# Patient Record
Sex: FEMALE | Race: WHITE | NOT HISPANIC OR LATINO | Employment: FULL TIME
[De-identification: names, ages, dates, MRNs, and addresses within clinical notes are randomized per-mention and may not be internally consistent; named-entity substitution may affect disease eponyms.]

---

## 2017-06-24 ENCOUNTER — HEALTH MAINTENANCE LETTER (OUTPATIENT)
Age: 62
End: 2017-06-24

## 2017-08-02 ENCOUNTER — OFFICE VISIT (OUTPATIENT)
Dept: UROLOGY | Facility: CLINIC | Age: 62
End: 2017-08-02
Payer: COMMERCIAL

## 2017-08-02 DIAGNOSIS — N39.0 RECURRENT UTI (URINARY TRACT INFECTION): Primary | ICD-10-CM

## 2017-08-02 LAB
ALBUMIN UR-MCNC: NEGATIVE MG/DL
APPEARANCE UR: CLEAR
BILIRUB UR QL STRIP: NEGATIVE
COLOR UR AUTO: YELLOW
GLUCOSE UR STRIP-MCNC: NEGATIVE MG/DL
HGB UR QL STRIP: ABNORMAL
KETONES UR STRIP-MCNC: NEGATIVE MG/DL
LEUKOCYTE ESTERASE UR QL STRIP: NEGATIVE
NITRATE UR QL: NEGATIVE
PH UR STRIP: 6 PH (ref 5–7)
SP GR UR STRIP: <=1.005 (ref 1–1.03)
URN SPEC COLLECT METH UR: ABNORMAL
UROBILINOGEN UR STRIP-ACNC: 0.2 EU/DL (ref 0.2–1)

## 2017-08-02 PROCEDURE — 81003 URINALYSIS AUTO W/O SCOPE: CPT | Performed by: UROLOGY

## 2017-08-02 PROCEDURE — 99203 OFFICE O/P NEW LOW 30 MIN: CPT | Performed by: UROLOGY

## 2017-08-02 NOTE — LETTER
8/2/2017       RE: Mari Turner  [place] [address]     Dear Colleague,    Thank you for referring your patient, Mari Turner, to the [facility]OLOG[facility] [place] at [facility]. Please see a copy of my visit note below.    History: It is a pleasure to see this very pleasant 62-year-old lady in initial consultation today.  She has a recent history of urinary tract infection which is been effectively treated but she has some mild persistent dysuria which seems to be gradually improving but is concerning.  She has not observed gross hematuria.  Urinalysis today shows only a trace of blood in the urine with no other remarkable features.  Her general health is otherwise satisfactory.  She has only had very rare urinary tract infections in the past.  She has never in the past observed gross hematuria, is not troubled by incontinence and is not troubled by significant frequency of micturition or nocturia  There are no other major medical issues at this time    Past Medical History:   Diagnosis Date     Mumps        No past surgical history on file.    No family history on file.    Social History     Social History     Marital status:      Spouse name: N/A     Number of children: N/A     Years of education: N/A     Occupational History     Not on file.     Social History Main Topics     Smoking status: Never Smoker     Smokeless tobacco: Never Used     Alcohol use Not on file     Drug use: Not on file     Sexual activity: Not on file     Other Topics Concern     Not on file     Social History Narrative     No narrative on file       No current outpatient prescriptions on file.     Review Of Systems:  Skin: negative  Eyes: negative  Ears/Nose/Throat: negative  Respiratory: No shortness of breath, dyspnea on exertion, cough, or hemoptysis  Cardiovascular: negative  Gastrointestinal: negative  Genitourinary: negative  Musculoskeletal:  negative  Neurologic: negative  Psychiatric: negative  Hematologic/Lymphatic/Immunologic: negative  Endocrine: negative    Exam:  There were no vitals taken for this visit.    General Impression: Very pleasant lady in no acute distress, well-oriented in time place and person    Mental status normal    HEENT: There is no evidence of jaundice and mucous membranes are normal    Skin: Skin is otherwise normal to examination    Lymph Nodes: Not examined    Respiratory System: The respiratory cycle is normal    Cardiovascular: Not examined    Abdominal: Not examined    Extremities: There is no significant peripheral edema    Back and Flank: There is no flank tenderness    Genital: Not examined    Rectal: Not examined    Neurologic:  There are no focal abnormal clinical neurological signs in the central, or peripheral nervous systems    Impression: This very pleasant 62-year-old lady has had a recent urinary tract infection which is being completely treated but as some persistent dysuria.  She also has a history of a bicuspid aortic valve.  However on careful discussion it transpires that she is drinking a lot of caffeinated beverages, most notably caffeinated sodas.  She is not drinking cranberry juice.  I discussed the situation with her in detail.  I have advised her that the most likely cause of her problems with dysuria are related to drinking caffeinated beverages and that she should stop doing this right away to see if it benefits of symptoms as I expect that it well.  I do not think we need to consider other treatment at this time.  However I have asked her to let me know should these symptoms not resolve by 4 weeks from now and if there is ongoing problems with the symptoms we will need to consider cystoscopy.  She had had a CT scan a year ago of the abdomen and pelvis and this did show a cystic lesion in the left kidney which had the characteristics of a calyceal diverticulum although there were no stones  "reported within the diverticulum.  She has not any associated flank pain and it is my opinion that this calyceal diverticulum has no relationship to her current urinary tract infection.  Usually these can be left alone if small and causing no symptoms, or causing stone formation.  I'll be very happy to see her again should this not resolve.  I have advised her that I should see her in the future if she observed any of the following  1.  Gross hematuria  2.  Worsening urinary symptoms  3 recurrent urinary tract infections    As stated earlier if this occurs she will require cystoscopy    I discussed the entire situation with the patient in detail.  I answered all her questions    Plan: I will see her on a p.r.n. basis.  If symptoms do not resolve she will require cystoscopy.  I advised to call us should this situation occur    Time: 30 minutes.  Greater than 50% spent in discussion and consultation    \"This dictation was performed with voice recognition software and may contain errors,  omissions and inadvertent word substitution.\"  Sincerely,    Parviz Mijares MD      "

## 2017-08-02 NOTE — MR AVS SNAPSHOT
"              After Visit Summary   2017    Mari Turner    MRN: [id number]           Patient Information     Date Of Birth          1955        Visit Information        Provider Department      2017 3:30 PM Parviz Mijares MD [facility] [place]        Today's Diagnoses     Recurrent UTI (urinary tract infection)    -  1       Follow-ups after your visit        Follow-up notes from your care team     Return if symptoms worsen or fail to improve.      Who to contact     If you have questions or need follow up information about today's clinic visit or your schedule please contact [facility] [place] directly at [phone].  Normal or non-critical lab and imaging results will be communicated to you by [employer], letter or phone within 4 business days after the clinic has received the results. If you do not hear from us within 7 days, please contact the clinic through [employer] or phone. If you have a critical or abnormal lab result, we will notify you by phone as soon as possible.  Submit refill requests through [employer] or call your pharmacy and they will forward the refill request to us. Please allow 3 business days for your refill to be completed.          Additional Information About Your Visit        MyChart Information     [employer] lets you send messages to your doctor, view your test results, renew your prescriptions, schedule appointments and more. To sign up, go to [email or web address] . Click on \"Log in\" on the left side of the screen, which will take you to the Welcome page. Then click on \"Sign up Now\" on the right side of the page.     You will be asked to enter the access code listed below, as well as some personal information. Please follow the directions to create your username and password.     Your access code is: [email or web address]  Expires: 10/31/2017  5:18 PM     Your access code will  in 90 days. If you need help " or a new code, please call your [facility] or [phone].        Care EveryWhere ID     This is your Care EveryWhere ID. This could be used by other organizations to access your [place] medical records  [id number]         Blood Pressure from Last 3 Encounters:   No data found for BP    Weight from Last 3 Encounters:   No data found for Wt              We Performed the Following     UA without Microscopic        Primary Care Provider Office Phone # Fax #    Radha Montoya [phone]       [facility] [address] [address] [address]        Equal Access to Services     [facility]: Hadii aad ku hadasho Soomaali, waaxda luqadaha, qaybta kaalmada adeteresayada, mae pedroza adeteresa santos . So [facility] [phone].    ATENCIÓN: Si habla español, tiene a woodward disposición servicios gratuitos de asistencia lingüística. [facility] [phone].    We comply with applicable federal civil rights laws and [place] laws. We do not discriminate on the basis of race, color, national origin, age, disability sex, sexual orientation or gender identity.            Thank you!     Thank you for choosing [facility] [place]  for your care. Our goal is always to provide you with excellent care. Hearing back from our patients is one way we can continue to improve our services. Please take a few minutes to complete the written survey that you may receive in the mail after your visit with us. Thank you!             Your Updated Medication List - Protect others around you: Learn how to safely use, store and throw away your medicines at www.disposemymeds.org.      Notice  As of 8/2/2017  5:18 PM    You have not been prescribed any medications.

## 2017-08-02 NOTE — PROGRESS NOTES
History: It is a pleasure to see this very pleasant 62-year-old lady in initial consultation today.  She has a recent history of urinary tract infection which is been effectively treated but she has some mild persistent dysuria which seems to be gradually improving but is concerning.  She has not observed gross hematuria.  Urinalysis today shows only a trace of blood in the urine with no other remarkable features.  Her general health is otherwise satisfactory.  She has only had very rare urinary tract infections in the past.  She has never in the past observed gross hematuria, is not troubled by incontinence and is not troubled by significant frequency of micturition or nocturia  There are no other major medical issues at this time    Past Medical History:   Diagnosis Date     Mumps        No past surgical history on file.    No family history on file.    Social History     Social History     Marital status:      Spouse name: N/A     Number of children: N/A     Years of education: N/A     Occupational History     Not on file.     Social History Main Topics     Smoking status: Never Smoker     Smokeless tobacco: Never Used     Alcohol use Not on file     Drug use: Not on file     Sexual activity: Not on file     Other Topics Concern     Not on file     Social History Narrative     No narrative on file       No current outpatient prescriptions on file.       Review Of Systems:  Skin: negative  Eyes: negative  Ears/Nose/Throat: negative  Respiratory: No shortness of breath, dyspnea on exertion, cough, or hemoptysis  Cardiovascular: negative  Gastrointestinal: negative  Genitourinary: negative  Musculoskeletal: negative  Neurologic: negative  Psychiatric: negative  Hematologic/Lymphatic/Immunologic: negative  Endocrine: negative    Exam:  There were no vitals taken for this visit.    General Impression: Very pleasant lady in no acute distress, well-oriented in time place and person    Mental status  normal    HEENT: There is no evidence of jaundice and mucous membranes are normal    Skin: Skin is otherwise normal to examination    Lymph Nodes: Not examined    Respiratory System: The respiratory cycle is normal    Cardiovascular: Not examined    Abdominal: Not examined    Extremities: There is no significant peripheral edema    Back and Flank: There is no flank tenderness    Genital: Not examined    Rectal: Not examined    Neurologic:  There are no focal abnormal clinical neurological signs in the central, or peripheral nervous systems    Impression: This very pleasant 62-year-old lady has had a recent urinary tract infection which is being completely treated but as some persistent dysuria.  She also has a history of a bicuspid aortic valve.  However on careful discussion it transpires that she is drinking a lot of caffeinated beverages, most notably caffeinated sodas.  She is not drinking cranberry juice.  I discussed the situation with her in detail.  I have advised her that the most likely cause of her problems with dysuria are related to drinking caffeinated beverages and that she should stop doing this right away to see if it benefits of symptoms as I expect that it well.  I do not think we need to consider other treatment at this time.  However I have asked her to let me know should these symptoms not resolve by 4 weeks from now and if there is ongoing problems with the symptoms we will need to consider cystoscopy.  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  She has not any associated flank pain and it is my opinion that this calyceal diverticulum has no relationship to her current urinary tract infection.  Usually these can be left alone if small and causing no symptoms, or causing stone formation.  I'll be very happy  "to see her again should this not resolve.  I have advised her that I should see her in the future if she observed any of the following  1.  Gross hematuria  2.  Worsening urinary symptoms  3 recurrent urinary tract infections    As stated earlier if this occurs she will require cystoscopy    I discussed the entire situation with the patient in detail.  I answered all her questions    Plan: I will see her on a p.r.n. basis.  If symptoms do not resolve she will require cystoscopy.  I advised to call us should this situation occur    Time: 30 minutes.  Greater than 50% spent in discussion and consultation    \"This dictation was performed with voice recognition software and may contain errors,  omissions and inadvertent word substitution.\"    "

## 2017-08-02 NOTE — NURSING NOTE
pvr =84ml  Pt states she is uncomfortable when bladder is full.  Dysuria, and sometimes freq.   Pt  Up once and awhile to void during the nt.  I talked with pt about bladder irritants.  HLA Espinoza, CMA

## 2017-11-14 ENCOUNTER — OFFICE VISIT (OUTPATIENT)
Dept: UROLOGY | Facility: CLINIC | Age: 62
End: 2017-11-14
Payer: COMMERCIAL

## 2017-11-14 DIAGNOSIS — Z87.898 HISTORY OF DYSURIA: Primary | ICD-10-CM

## 2017-11-14 DIAGNOSIS — Z79.2 PROPHYLACTIC ANTIBIOTIC: Primary | ICD-10-CM

## 2017-11-14 DIAGNOSIS — Z87.898 HISTORY OF DYSURIA: ICD-10-CM

## 2017-11-14 LAB
ALBUMIN UR-MCNC: NEGATIVE MG/DL
APPEARANCE UR: CLEAR
BILIRUB UR QL STRIP: NEGATIVE
COLOR UR AUTO: YELLOW
GLUCOSE UR STRIP-MCNC: NEGATIVE MG/DL
HGB UR QL STRIP: ABNORMAL
KETONES UR STRIP-MCNC: NEGATIVE MG/DL
LEUKOCYTE ESTERASE UR QL STRIP: NEGATIVE
NITRATE UR QL: NEGATIVE
PH UR STRIP: 5.5 PH (ref 5–7)
SOURCE: ABNORMAL
SP GR UR STRIP: 1.01 (ref 1–1.03)
UROBILINOGEN UR STRIP-ACNC: 0.2 EU/DL (ref 0.2–1)

## 2017-11-14 PROCEDURE — 99213 OFFICE O/P EST LOW 20 MIN: CPT | Mod: 25 | Performed by: UROLOGY

## 2017-11-14 PROCEDURE — 52000 CYSTOURETHROSCOPY: CPT | Performed by: UROLOGY

## 2017-11-14 PROCEDURE — 81003 URINALYSIS AUTO W/O SCOPE: CPT | Performed by: UROLOGY

## 2017-11-14 RX ORDER — CIPROFLOXACIN 500 MG/1
500 TABLET, FILM COATED ORAL ONCE
Qty: 1 TABLET | Refills: 0 | Status: SHIPPED | OUTPATIENT
Start: 2017-11-14 | End: 2017-11-14

## 2017-11-14 RX ORDER — DIPHENOXYLATE HCL/ATROPINE 2.5-.025MG
1 TABLET ORAL 4 TIMES DAILY PRN
COMMUNITY
End: 2019-06-24

## 2017-11-14 NOTE — LETTER
11/14/2017       RE: Mari Turner  [place] [address]     Dear Colleague,    Thank you for referring your patient, Mari Turner, to the [facility] [place] at [facility]. Please see a copy of my visit note below.    This very pleasant 62-year-old lady returns today for cystoscopy.  She has a previous history of recurrent infections although there is no sign of that now that she is getting persistent dysuria with daytime frequency but without evidence of incontinence.  She is also noticing what is likely significant pelvic pain.  We had recommended a regime of elimination of caffeine and crying reduce and taking a regular warm baths but this has incompletely resolve the situation.  The CT scan showed no remarkable features except for a calyceal diverticulum in the left kidney without stones.  There is no evidence of flank pain and this calyceal diverticulum is unlikely to have any relationship to her pelvic symptoms.    .  Procedure.  Cystoscopy with dilatation of urethra.  Surgeon.   Dyllan.  Anesthesia.  Local anesthesia.  Description.  With the patient in the dorsal lithotomy position, with the genital area prepped and draped in the gastric fashion.  I examined the area there is a very small urethral caruncle but of little clinical significance.  I could not insert the cystoscope due to stenosis of the urethra.  I then dilated the urethra from 16 up to 20-Mongolian with the straight urethral dilators with some discomfort to the patient, but I could then insert the flexible cystoscope without difficulty.  There was no evidence of neoplasm, calculus or inflammation in the bladder.  It were no other remarkable features.    Impression.  It is possible that much of her symptoms may be related to urethral stenosis.  It is possible that the dilatation may relieve some of those symptoms.  However if there are continuing symptoms  "aren't better recommend a course of urethral suppositories to see if we can help alleviate the situation.  I have once again however reemphasized the importance of caffeine avoidance etc. as before.  I will see her again if are not making progress and that is the case we may need to last the opinion of Dr. Concepcion Berry.  I discussed the entire situation carefully with the patient in detail today.  I answered all the questions.    Plan.  Course of urethral suppositories and I will then see her on a p.r.n. basis.  If needed, we may refer her to Dr. Berry..    Time.  15 minutes was spent in addition to the procedure to carefully review of the previous records to talk about the findings to discuss potential alternative treatment options and possibly even the need to further referral    \"This dictation was performed with voice recognition software and may contain errors,  omissions and inadvertent word substitution.\"      Again, thank you for allowing me to participate in the care of your patient.      Sincerely,    Parviz Mijares MD      "

## 2017-11-14 NOTE — PATIENT INSTRUCTIONS

## 2017-11-14 NOTE — PROGRESS NOTES
This very pleasant 62-year-old lady returns today for cystoscopy.  She has a previous history of recurrent infections although there is no sign of that now that she is getting persistent dysuria with daytime frequency but without evidence of incontinence.  She is also noticing what is likely significant pelvic pain.  We had recommended a regime of elimination of caffeine and crying reduce and taking a regular warm baths but this has incompletely resolve the situation.  The CT scan showed no remarkable features except for a calyceal diverticulum in the left kidney without stones.  There is no evidence of flank pain and this calyceal diverticulum is unlikely to have any relationship to her pelvic symptoms.    .  Procedure.  Cystoscopy with dilatation of urethra.  Surgeon.   Dyllan.  Anesthesia.  Local anesthesia.  Description.  With the patient in the dorsal lithotomy position, with the genital area prepped and draped in the gastric fashion.  I examined the area there is a very small urethral caruncle but of little clinical significance.  I could not insert the cystoscope due to stenosis of the urethra.  I then dilated the urethra from 16 up to 20-Bangladeshi with the straight urethral dilators with some discomfort to the patient, but I could then insert the flexible cystoscope without difficulty.  There was no evidence of neoplasm, calculus or inflammation in the bladder.  It were no other remarkable features.    Impression.  It is possible that much of her symptoms may be related to urethral stenosis.  It is possible that the dilatation may relieve some of those symptoms.  However if there are continuing symptoms aren't better recommend a course of urethral suppositories to see if we can help alleviate the situation.  I have once again however reemphasized the importance of caffeine avoidance etc. as before.  I will see her again if are not making progress and that is the case we may need to last the opinion of   "Concepcion Berry.  I discussed the entire situation carefully with the patient in detail today.  I answered all the questions.    Plan.  Course of urethral suppositories and I will then see her on a p.r.n. basis.  If needed, we may refer her to Dr. Berry..    Time.  15 minutes was spent in addition to the procedure to carefully review of the previous records to talk about the findings to discuss potential alternative treatment options and possibly even the need to further referral    \"This dictation was performed with voice recognition software and may contain errors,  omissions and inadvertent word substitution.\"    "

## 2017-11-14 NOTE — NURSING NOTE
Chief Complaint   Patient presents with     Cystoscopy     History of Recurret Uti's      Prior to the start of the procedure and with procedural staff participation, I verbally confirmed the patient s identity using two indicators, relevant allergies, that the procedure was appropriate and matched the consent or emergent situation, and that the correct equipment/implants were available. Immediately prior to starting the procedure I conducted the Time Out with the procedural staff and re-confirmed the patient s name, procedure, and site/side. (The Joint Commission universal protocol was followed.)  Yes    Sedation (Moderate or Deep): None    Tram Feliz LPN

## 2017-11-14 NOTE — MR AVS SNAPSHOT
"              After Visit Summary   11/14/2017    Mari Turner    MRN: [id number]           Patient Information     Date Of Birth          1955        Visit Information        Provider Department      11/14/2017 3:40 PM Parviz Mijares MD; [facility] [place]        Today's Diagnoses     Prophylactic antibiotic    -  1    History of dysuria          Care Instructions         AFTER YOUR CYSTOSCOPY         You have just completed a cystoscopy, or \"cysto\", which allowed your physician to learn more about your bladder (or to remove a stent placed after surgery). We suggest that you continue to avoid caffeine, fruit juice, and alcohol for the next 24 hours, however, you are encouraged to return to your normal activities.       A few things that are considered normal after your cystoscopy:    * small amount of bleeding (or spotting) that clears within the next 24 hours    * slight burning sensation with urination    * sensation to of needing to avoid more frequently    * the feeling of \"air\" in your urine    * mild discomfort that is relieved with Tylonol        Please contact our office promptly if you:    * develop a fever above 101 degrees    * are unable to urinate    * develop bright red blood that does not stop    * severe pain or swelling        And of course, please contact our office with any concerns or questions [phone]              Follow-ups after your visit        Who to contact     If you have questions or need follow up information about today's clinic visit or your schedule please contact [facility] [place] directly at [phone].  Normal or non-critical lab and imaging results will be communicated to you by MyChart, letter or phone within 4 business days after the clinic has received the results. If you do not hear from us within 7 days, please contact the clinic through MyChart or phone. If you have a critical " "or abnormal lab result, we will notify you by phone as soon as possible.  Submit refill requests through Everwise or call your pharmacy and they will forward the refill request to us. Please allow 3 business days for your refill to be completed.          Additional Information About Your Visit        OROShart Information     Everwise lets you send messages to your doctor, view your test results, renew your prescriptions, schedule appointments and more. To sign up, go to [email or web address] . Click on \"Log in\" on the left side of the screen, which will take you to the Welcome page. Then click on \"Sign up Now\" on the right side of the page.     You will be asked to enter the access code listed below, as well as some personal information. Please follow the directions to create your username and password.     Your access code is: [email or web address]  Expires: 2018  4:22 PM     Your access code will  in 90 days. If you need help or a new code, please call your [place] clinic or [phone].        Care EveryWhere ID     This is your Care EveryWhere ID. This could be used by other organizations to access your [place] medical records  [id number]         Blood Pressure from Last 3 Encounters:   No data found for BP    Weight from Last 3 Encounters:   No data found for Wt              We Performed the Following     UA without Microscopic          Today's Medication Changes          These changes are accurate as of: 17  4:22 PM.  If you have any questions, ask your nurse or doctor.               Start taking these medicines.        Dose/Directions    * ciprofloxacin 500 MG tablet   Commonly known as:  CIPRO   Used for:  Prophylactic antibiotic   Started by:  Parviz Mijares MD        Dose:  500 mg   Take 1 tablet (500 mg) by mouth once for 1 dose   Quantity:  1 tablet   Refills:  0       * ciprofloxacin 500 MG tablet   Commonly known as:  CIPRO   Used for:  History of dysuria, Prophylactic antibiotic "   Started by:  Parviz Mijares MD        Dose:  500 mg   Take 1 tablet (500 mg) by mouth once for 1 dose   Quantity:  1 tablet   Refills:  0       * Notice:  This list has 2 medication(s) that are the same as other medications prescribed for you. Read the directions carefully, and ask your doctor or other care provider to review them with you.         Where to get your medicines      These medications were sent to [facility] [place], [place] - [address]  [address] [facility] [address], [place] [address]     Phone:  [phone]     ciprofloxacin 500 MG tablet         These medications were sent to [facility] [address] [place] [address] [address] AT [place] [address]  [id number] [place] [address]    Hours:  24-hours Phone:  [phone]     ciprofloxacin 500 MG tablet                Primary Care Provider Office Phone # Fax #    Radha Montoya [phone]       [facility] [place] [address] [facility] [address]        Equal Access to Services     [facility]: Hadii aad ku hadasho Soomaali, waaxda luqadaha, qaybta kaalmada adeegyaqing, mae santos . So [facility] [phone].    ATENCIÓN: Si habla español, tiene a woodward disposición servicios gratuitos de asistencia lingüística. [facility] [phone].    We comply with applicable federal civil rights laws and [place] laws. We do not discriminate on the basis of race, color, national origin, age, disability, sex, sexual orientation, or gender identity.            Thank you!     Thank you for choosing [facility] [place]  for your care. Our goal is always to provide you with excellent care. Hearing back from our patients is one way we can continue to improve our services. Please take a few minutes to complete the written survey that you may receive in the mail after your visit with us. Thank you!             Your Updated  Medication List - Protect others around you: Learn how to safely use, store and throw away your medicines at www.disposemymeds.org.          This list is accurate as of: 11/14/17  4:22 PM.  Always use your most recent med list.                   Brand Name Dispense Instructions for use Diagnosis    * ciprofloxacin 500 MG tablet    CIPRO    1 tablet    Take 1 tablet (500 mg) by mouth once for 1 dose    Prophylactic antibiotic       * ciprofloxacin 500 MG tablet    CIPRO    1 tablet    Take 1 tablet (500 mg) by mouth once for 1 dose    History of dysuria, Prophylactic antibiotic       diphenoxylate-atropine 2.5-0.025 MG per tablet    LOMOTIL     Take 1 tablet by mouth 4 times daily as needed for diarrhea        * Notice:  This list has 2 medication(s) that are the same as other medications prescribed for you. Read the directions carefully, and ask your doctor or other care provider to review them with you.

## 2018-05-31 ENCOUNTER — TRANSFERRED RECORDS (OUTPATIENT)
Dept: HEALTH INFORMATION MANAGEMENT | Facility: CLINIC | Age: 63
End: 2018-05-31

## 2018-06-06 DIAGNOSIS — N28.1 ACQUIRED CYST OF KIDNEY: Primary | ICD-10-CM

## 2018-06-07 ENCOUNTER — OFFICE VISIT (OUTPATIENT)
Dept: UROLOGY | Facility: CLINIC | Age: 63
End: 2018-06-07
Payer: COMMERCIAL

## 2018-06-07 VITALS
WEIGHT: 155 LBS | OXYGEN SATURATION: 98 % | BODY MASS INDEX: 26.46 KG/M2 | HEART RATE: 82 BPM | SYSTOLIC BLOOD PRESSURE: 110 MMHG | DIASTOLIC BLOOD PRESSURE: 74 MMHG | HEIGHT: 64 IN

## 2018-06-07 DIAGNOSIS — N28.1 ACQUIRED CYST OF KIDNEY: ICD-10-CM

## 2018-06-07 LAB
ALBUMIN UR-MCNC: NEGATIVE MG/DL
APPEARANCE UR: CLEAR
BILIRUB UR QL STRIP: NEGATIVE
COLOR UR AUTO: YELLOW
GLUCOSE UR STRIP-MCNC: NEGATIVE MG/DL
HGB UR QL STRIP: ABNORMAL
KETONES UR STRIP-MCNC: NEGATIVE MG/DL
LEUKOCYTE ESTERASE UR QL STRIP: NEGATIVE
NITRATE UR QL: NEGATIVE
PH UR STRIP: 5 PH (ref 5–7)
SOURCE: ABNORMAL
SP GR UR STRIP: 1.02 (ref 1–1.03)
UROBILINOGEN UR STRIP-ACNC: 0.2 EU/DL (ref 0.2–1)

## 2018-06-07 PROCEDURE — 81003 URINALYSIS AUTO W/O SCOPE: CPT | Performed by: UROLOGY

## 2018-06-07 PROCEDURE — 99214 OFFICE O/P EST MOD 30 MIN: CPT | Performed by: UROLOGY

## 2018-06-07 RX ORDER — SIMVASTATIN 20 MG
20 TABLET ORAL
COMMUNITY
Start: 2018-05-14

## 2018-06-07 ASSESSMENT — PAIN SCALES - GENERAL: PAINLEVEL: MILD PAIN (3)

## 2018-06-07 NOTE — PROGRESS NOTES
History: There is a great pleasure to see this very pleasant 62-year-old lady in follow-up consultation today  We had seen her in the past because of a history of urinary tract infection.  We did perform cystoscopy at that time, finding some stenosis of the urethra, but no other remarkable features.  She has had occasional episodes of dysuria since then but on each occasion urinalysis is negative.  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  More recently she is to be done of developing pain in the left side which she describes as passing down from the low part of the back on the left side down her buttock and made worse by certain movements.  In addition a recent CT scan without contrast shows calcification wall of the cyst in the left kidney  There is some concern about whether this is anything of significance of whether this needs further investigation or intervention    Past Medical History:   Diagnosis Date     Mumps        Social History     Social History     Marital status:      Spouse name: N/A     Number of children: N/A     Years of education: N/A     Social History Main Topics     Smoking status: Never Smoker     Smokeless tobacco: Never Used     Alcohol use None     Drug use: None     Sexual activity: Not Asked     Other Topics Concern     None     Social History Narrative       History reviewed. No pertinent surgical history.    History reviewed. No pertinent family history.      Current Outpatient Prescriptions:      simvastatin (ZOCOR) 20 MG tablet, Take 20 mg by mouth, Disp: , Rfl:      diphenoxylate-atropine (LOMOTIL) 2.5-0.025 MG per tablet, Take 1 tablet by mouth 4 times daily as needed for diarrhea, Disp: , Rfl:     10 point ROS of systems including Constitutional, Eyes, Respiratory, Cardiovascular, Gastroenterology, Genitourinary, Integumentary, Muscularskeletal,  "Psychiatric were all negative except for pertinent positives noted in my HPI.    Examination:   /74 (BP Location: Left arm, Patient Position: Sitting, Cuff Size: Adult Regular)  Pulse 82  Ht 1.626 m (5' 4\")  Wt 70.3 kg (155 lb)  SpO2 98%  BMI 26.61 kg/m2  General Impression: Very pleasant lady in no acute distress, well oriented in time place and person  Mental Status: Normal.  HEENT there is no clinical evidence of jaundice and the mucous membranes are normal  Skin: The skin is normal to examination  Respiratory System: Respiratory cycle is normal  Lymph Nodes: Not examined  Back/Flank Tenderness: There is no flank tenderness over the kidney, there is however tenderness just above the left buttock on the left side and tenderness over the buttock itself.  I noted that direct flexion and extension of the spine causes no pain but that attempts at rotation of the spine caused some pain in this area.  I could detect no evidence of kyphosis, scoliosis or lordosis  Cardiovascular System: Not examined  Abdominal Examination: Not examined  Extremities: There is no peripheral edema  Genitial: Not examined  Rectal Examination: Not examined  Neurologic System: There are no focal abnormal clinical neurological signs in the central, or peripheral nervous systems    Impression: I extensively reviewed her previous radiologic studies.  A cystic lesion in the left kidney is slowly getting bigger and was 5.5 cm in diameter last year and now was 6.1 cm in diameter.  There is a septation within this cystic lesion and some calcium in the wall of the cyst.  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  Following this discussion however I would like to continue to monitor this and recommend we repeat the CT scan with and without contrast of the abdomen and " "pelvis, in 1 years time.  In addition I have advised the patient that the symptoms she describes are not necessarily due to an infection when she gets dysuria I we have not demonstrated any evidence of infection on urinalysis on these occasions.  She is drinking a lot of sodas and I am recommending that the caffeine related to this may be a major feature of her symptoms and that she should endeavor to avoid caffeinated beverages.  I did discuss the entire situation with the patient in detail today.  We did have a lengthy discussion including discussion during the consultation with my radiologic colleagues.  I answered many questions.    Plan: 1 year for CT scan abdomen and pelvis with and without contrast and examination    Time: 30 minutes with greater than 50% in discussion and consultation    \"This dictation was performed with voice recognition software and may contain errors,  omissions and inadvertent word substitution.\"      "

## 2018-06-07 NOTE — LETTER
6/7/2018       RE: Mari Turner  [place] [address]     Dear Colleague,    Thank you for referring your patient, Mari Turner, to the [facility] [place] at [facility]. Please see a copy of my visit note below.    History: There is a great pleasure to see this very pleasant 62-year-old lady in follow-up consultation today  We had seen her in the past because of a history of urinary tract infection.  We did perform cystoscopy at that time, finding some stenosis of the urethra, but no other remarkable features.  She has had occasional episodes of dysuria since then but on each occasion urinalysis is negative.  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  More recently she is to be done of developing pain in the left side which she describes as passing down from the low part of the back on the left side down her buttock and made worse by certain movements.  In addition a recent CT scan without contrast shows calcification wall of the cyst in the left kidney  There is some concern about whether this is anything of significance of whether this needs further investigation or intervention    Past Medical History:   Diagnosis Date     Mumps        Social History     Social History     Marital status:      Spouse name: N/A     Number of children: N/A     Years of education: N/A     Social History Main Topics     Smoking status: Never Smoker     Smokeless tobacco: Never Used     Alcohol use None     Drug use: None     Sexual activity: Not Asked     Other Topics Concern     None     Social History Narrative       History reviewed. No pertinent surgical history.    History reviewed. No pertinent family history.      Current Outpatient Prescriptions:      simvastatin (ZOCOR) 20 MG tablet, Take 20 mg by  "mouth, Disp: , Rfl:      diphenoxylate-atropine (LOMOTIL) 2.5-0.025 MG per tablet, Take 1 tablet by mouth 4 times daily as needed for diarrhea, Disp: , Rfl:     10 point ROS of systems including Constitutional, Eyes, Respiratory, Cardiovascular, Gastroenterology, Genitourinary, Integumentary, Muscularskeletal, Psychiatric were all negative except for pertinent positives noted in my HPI.    Examination:   /74 (BP Location: Left arm, Patient Position: Sitting, Cuff Size: Adult Regular)  Pulse 82  Ht 1.626 m (5' 4\")  Wt 70.3 kg (155 lb)  SpO2 98%  BMI 26.61 kg/m2  General Impression: Very pleasant lady in no acute distress, well oriented in time place and person  Mental Status: Normal.  HEENT there is no clinical evidence of jaundice and the mucous membranes are normal  Skin: The skin is normal to examination  Respiratory System: Respiratory cycle is normal  Lymph Nodes: Not examined  Back/Flank Tenderness: There is no flank tenderness over the kidney, there is however tenderness just above the left buttock on the left side and tenderness over the buttock itself.  I noted that direct flexion and extension of the spine causes no pain but that attempts at rotation of the spine caused some pain in this area.  I could detect no evidence of kyphosis, scoliosis or lordosis  Cardiovascular System: Not examined  Abdominal Examination: Not examined  Extremities: There is no peripheral edema  Genitial: Not examined  Rectal Examination: Not examined  Neurologic System: There are no focal abnormal clinical neurological signs in the central, or peripheral nervous systems    Impression: I extensively reviewed her previous radiologic studies.  A cystic lesion in the left kidney is slowly getting bigger and was 5.5 cm in diameter last year and now was 6.1 cm in diameter.  There is a septation within this cystic lesion and some calcium in the wall of the cyst.  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  Following this discussion however I would like to continue to monitor this and recommend we repeat the CT scan with and without contrast of the abdomen and pelvis, in 1 years time.  In addition I have advised the patient that the symptoms she describes are not necessarily due to an infection when she gets dysuria I we have not demonstrated any evidence of infection on urinalysis on these occasions.  She is drinking a lot of sodas and I am recommending that the caffeine related to this may be a major feature of her symptoms and that she should endeavor to avoid caffeinated beverages.  I did discuss the entire situation with the patient in detail today.  We did have a lengthy discussion including discussion during the consultation with my radiologic colleagues.  I answered many questions.    Plan: 1 year for CT scan abdomen and pelvis with and without contrast and examination    Time: 30 minutes with greater than 50% in discussion and consultation    \"This dictation was performed with voice recognition software and may contain errors,  omissions and inadvertent word substitution.\"        Again, thank you for allowing me to participate in the care of your patient.      Sincerely,    Parviz Mijares MD      "

## 2018-06-07 NOTE — MR AVS SNAPSHOT
"              After Visit Summary   6/7/2018    Mari Turner    MRN: [id number]           Patient Information     Date Of Birth          1955        Visit Information        Provider Department      6/7/2018 3:20 PM Parviz Mijares MD [facility] [place]        Today's Diagnoses     Acquired cyst of kidney           Follow-ups after your visit        Follow-up notes from your care team     Return in about 1 year (around 6/7/2019) for Physical Exam, CT Abdo/Pelvis with/without.      Your next 10 appointments already scheduled     Jun 14, 2018  4:10 PM CDT   MA SCREENING DIGITAL BILATERAL with [id number]   [facility]    [address] [facility], [address]  [facility] [address] [phone]           Do not use any powder, lotion or deodorant under your arms or on your breast. If you do, we will ask you to remove it before your exam.  Wear comfortable, two-piece clothing.  If you have any allergies, tell your care team.  Bring any previous mammograms from other facilities or have them mailed to the breast center. Three-dimensional (3D) mammograms are available at [facility] locations in [facility], [place], [place], [facility], [place], [place], and [place]. [facility] locations include [place] and [facility] in [place]. Benefits of 3D mammograms include: - Improved rate of cancer detection - Decreases your chance of having to go back for more tests, which means fewer: - \"False-positive\" results (This means that there is an abnormal area but it isn't cancer.) - Invasive testing procedures, such as a biopsy or surgery - Can provide clearer images of the breast if you have dense breast tissue. 3D mammography is an optional exam that anyone can have with a 2D mammogram. It doesn't replace or take the place of a 2D mammogram. 2D mammograms remain an effective screening test for all women.  Not all insurance " "companies cover the cost of a 3D mammogram. Check with your insurance.              Future tests that were ordered for you today     Open Future Orders        Priority Expected Expires Ordered    CT Abdomen Pelvis w/o & w Contrast [NKL710] Routine  2019            Who to contact     If you have questions or need follow up information about today's clinic visit or your schedule please contact [facility] [place] directly at [phone].  Normal or non-critical lab and imaging results will be communicated to you by Pindrop Securityhart, letter or phone within 4 business days after the clinic has received the results. If you do not hear from us within 7 days, please contact the clinic through Walleriust or phone. If you have a critical or abnormal lab result, we will notify you by phone as soon as possible.  Submit refill requests through Workables or call your pharmacy and they will forward the refill request to us. Please allow 3 business days for your refill to be completed.          Additional Information About Your Visit        Workables Information     Workables lets you send messages to your doctor, view your test results, renew your prescriptions, schedule appointments and more. To sign up, go to [email or web address] . Click on \"Log in\" on the left side of the screen, which will take you to the Welcome page. Then click on \"Sign up Now\" on the right side of the page.     You will be asked to enter the access code listed below, as well as some personal information. Please follow the directions to create your username and password.     Your access code is: [email or web address]  Expires: 2018  5:08 PM     Your access code will  in 90 days. If you need help or a new code, please call your [place] clinic or [phone].        Care EveryWhere ID     This is your Care EveryWhere ID. This could be used by other organizations to access your [place] medical records  [id number]      " "  Your Vitals Were     Pulse Height Pulse Oximetry BMI (Body Mass Index)          82 1.626 m (5' 4\") 98% 26.61 kg/m2         Blood Pressure from Last 3 Encounters:   06/07/18 110/74    Weight from Last 3 Encounters:   06/07/18 70.3 kg (155 lb)              We Performed the Following     UA without Microscopic        Primary Care Provider Office Phone # Fax #    Radha Montoya [phone]       [facility] [address] [address] [facility] [address]        Equal Access to Services     [facility]: Hadii aad ku hadasho Soomaali, waaxda luqadaha, qaybta kaalmada adeegyada, waxay ernestinain haycarmellan adeteresa santos . So [facility] [phone].    ATENCIÓN: Si habla español, tiene a woodward disposición servicios gratuitos de asistencia lingüística. [facility] [phone].    We comply with applicable federal civil rights laws and [place] laws. We do not discriminate on the basis of race, color, national origin, age, disability, sex, sexual orientation, or gender identity.            Thank you!     Thank you for choosing [facility] [place]  for your care. Our goal is always to provide you with excellent care. Hearing back from our patients is one way we can continue to improve our services. Please take a few minutes to complete the written survey that you may receive in the mail after your visit with us. Thank you!             Your Updated Medication List - Protect others around you: Learn how to safely use, store and throw away your medicines at www.disposemymeds.org.          This list is accurate as of 6/7/18  5:08 PM.  Always use your most recent med list.                   Brand Name Dispense Instructions for use Diagnosis    diphenoxylate-atropine 2.5-0.025 MG per tablet    LOMOTIL     Take 1 tablet by mouth 4 times daily as needed for diarrhea        simvastatin 20 MG tablet    ZOCOR     Take 20 mg by mouth          "

## 2018-06-14 ENCOUNTER — HOSPITAL ENCOUNTER (OUTPATIENT)
Dept: MAMMOGRAPHY | Facility: CLINIC | Age: 63
Discharge: HOME | End: 2018-06-14
Attending: OBSTETRICS & GYNECOLOGY | Admitting: OBSTETRICS & GYNECOLOGY
Payer: COMMERCIAL

## 2018-06-14 DIAGNOSIS — Z12.31 VISIT FOR SCREENING MAMMOGRAM: ICD-10-CM

## 2018-06-14 PROCEDURE — 77067 SCR MAMMO BI INCL CAD: CPT

## 2019-06-03 ENCOUNTER — HOSPITAL ENCOUNTER (OUTPATIENT)
Dept: CT IMAGING | Facility: CLINIC | Age: 64
Discharge: HOME | End: 2019-06-03
Attending: UROLOGY | Admitting: UROLOGY
Payer: COMMERCIAL

## 2019-06-03 DIAGNOSIS — N28.1 ACQUIRED CYST OF KIDNEY: ICD-10-CM

## 2019-06-03 PROCEDURE — 74170 CT ABD WO CNTRST FLWD CNTRST: CPT

## 2019-06-03 PROCEDURE — 25000128 H RX IP 250 OP 636: Performed by: RADIOLOGY

## 2019-06-03 RX ORDER — IOPAMIDOL 755 MG/ML
500 INJECTION, SOLUTION INTRAVASCULAR ONCE
Status: COMPLETED | OUTPATIENT
Start: 2019-06-03 | End: 2019-06-03

## 2019-06-03 RX ADMIN — IOPAMIDOL 78 ML: 755 INJECTION, SOLUTION INTRAVENOUS at 07:41

## 2019-06-03 RX ADMIN — SODIUM CHLORIDE 59 ML: 9 INJECTION, SOLUTION INTRAVENOUS at 07:41

## 2019-06-20 DIAGNOSIS — Z87.440 PERSONAL HISTORY OF URINARY TRACT INFECTION: Primary | ICD-10-CM

## 2019-06-24 ENCOUNTER — OFFICE VISIT (OUTPATIENT)
Dept: UROLOGY | Facility: CLINIC | Age: 64
End: 2019-06-24
Payer: COMMERCIAL

## 2019-06-24 VITALS
WEIGHT: 160 LBS | SYSTOLIC BLOOD PRESSURE: 132 MMHG | OXYGEN SATURATION: 95 % | HEIGHT: 64 IN | HEART RATE: 100 BPM | BODY MASS INDEX: 27.31 KG/M2 | DIASTOLIC BLOOD PRESSURE: 86 MMHG

## 2019-06-24 DIAGNOSIS — Z87.440 PERSONAL HISTORY OF URINARY TRACT INFECTION: ICD-10-CM

## 2019-06-24 LAB
ALBUMIN UR-MCNC: NEGATIVE MG/DL
APPEARANCE UR: CLEAR
BILIRUB UR QL STRIP: NEGATIVE
COLOR UR AUTO: YELLOW
GLUCOSE UR STRIP-MCNC: NEGATIVE MG/DL
HGB UR QL STRIP: ABNORMAL
KETONES UR STRIP-MCNC: NEGATIVE MG/DL
LEUKOCYTE ESTERASE UR QL STRIP: NEGATIVE
NITRATE UR QL: NEGATIVE
PH UR STRIP: 5 PH (ref 5–7)
SOURCE: ABNORMAL
SP GR UR STRIP: <=1.005 (ref 1–1.03)
UROBILINOGEN UR STRIP-ACNC: 0.2 EU/DL (ref 0.2–1)

## 2019-06-24 PROCEDURE — 99214 OFFICE O/P EST MOD 30 MIN: CPT | Performed by: UROLOGY

## 2019-06-24 PROCEDURE — 81003 URINALYSIS AUTO W/O SCOPE: CPT | Performed by: UROLOGY

## 2019-06-24 ASSESSMENT — MIFFLIN-ST. JEOR: SCORE: 1275.76

## 2019-06-24 ASSESSMENT — PAIN SCALES - GENERAL: PAINLEVEL: NO PAIN (0)

## 2019-06-24 NOTE — NURSING NOTE
"Chief Complaint   Patient presents with     History of Persistant UTI's     Pt is here to review CT results and has a hx of UTI's       There is no problem list on file for this patient.      No Known Allergies    /86   Pulse 100   Ht 1.626 m (5' 4\")   Wt 72.6 kg (160 lb)   SpO2 95%   BMI 27.46 kg/m            Juan Mariano, EMT-B  6/24/2019         "

## 2019-06-24 NOTE — LETTER
6/24/2019       RE: Mari Turner  [place] [address]     Dear Colleague,    Thank you for referring your patient, Mari Turner, to the [facility] UROLOGY [facility] [place] at [facility]. Please see a copy of my visit note below.    History: It is a great pleasure to see this very pleasant 63-year-old lady in follow-up consultation today.  She has a history of recurrent urinary tract infections and also of the presence of a large cyst on the upper pole of the left kidney which has the characteristics of a Bosniak 2 cyst.  She is also now mentioning occasional discomfort in the left lower quadrant.  This does not seem to be related specifically to movement although it is relieved when she stands up.  However it is definitely not in the flank.  Her general health is otherwise stable.  She has had no further infections in the urine over the last year and she is not on any prophylactic antibiotic treatment.  Her general health is otherwise very stable    Past Medical History:   Diagnosis Date     Mumps        Social History     Socioeconomic History     Marital status:      Spouse name: None     Number of children: None     Years of education: None     Highest education level: None   Occupational History     None   Social Needs     Financial resource strain: None     Food insecurity:     Worry: None     Inability: None     Transportation needs:     Medical: None     Non-medical: None   Tobacco Use     Smoking status: Never Smoker     Smokeless tobacco: Never Used   Substance and Sexual Activity     Alcohol use: None     Drug use: None     Sexual activity: None   Lifestyle     Physical activity:     Days per week: None     Minutes per session: None     Stress: None   Relationships     Social connections:     Talks on phone: None     Gets together: None     Attends Adventism service: None     Active member of club or organization: None     " Attends meetings of clubs or organizations: None     Relationship status: None     Intimate partner violence:     Fear of current or ex partner: None     Emotionally abused: None     Physically abused: None     Forced sexual activity: None   Other Topics Concern     Parent/sibling w/ CABG, MI or angioplasty before 65F 55M? Not Asked   Social History Narrative     None       History reviewed. No pertinent surgical history.    History reviewed. No pertinent family history.      Current Outpatient Medications:      simvastatin (ZOCOR) 20 MG tablet, Take 20 mg by mouth, Disp: , Rfl:     10 point ROS of systems including Constitutional, Eyes, Respiratory, Cardiovascular, Gastroenterology, Genitourinary, Integumentary, Muscularskeletal, Psychiatric were all negative except for pertinent positives noted in my HPI.    Examination:   /86   Pulse 100   Ht 1.626 m (5' 4\")   Wt 72.6 kg (160 lb)   SpO2 95%   BMI 27.46 kg/m     General Impression: Very pleasant lady in no acute distress, well-oriented in time place and person  Mental Status: Normal.  HEENT.  There is no clinical evidence of jaundice, the mucous membranes are normal  Skin: Skin is otherwise normal to examination  Respiratory System: The respiratory cycle is normal  Lymph Nodes: Not examined  Back/Flank Tenderness: There is no flank tenderness  Cardiovascular System: There is no significant peripheral pitting edema  Abdominal Examination: Examination of the abdomen reveals a mildly obese abdomen, there are no palpable masses, there is some mild left lower quadrant tenderness over the region of the sigmoid colon but no evidence of rebound or any other significant features in the abdomen  Extremities: The extremities are otherwise unremarkable  Genitial: Not examined  Rectal Examination: Not examined  Neurologic System: There are no focal abnormal clinical neurological signs in the central, or peripheral nerve systems    Impression: I reviewed the " situation and went over the current CT scan with her carefully today.  CT ABDOMEN WITHOUT AND WITH CONTRAST   6/3/2019 7:44 AM      HISTORY: Acquired renal cyst.     COMPARISON: 5/31/2018 - [facility] [place], [place]. The report from that study describes a 6.1 x 4.5 x 4.0 cm  left renal cyst with thick posterior peripheral calcification.     TECHNIQUE: Prior to the administration of intravenous contrast,  helical sections were acquired through the kidneys. Following the  uneventful administration of 78 mL Isovue-370 intravenous contrast,  helical sections were acquired from the top of the diaphragm through  the iliac crests. Excretory phase images were also acquired through  the kidneys. Coronal and sagittal reconstructions were generated.  Radiation dose for this scan was reduced using automated exposure  control, adjustment of the mA and/or kV according to the patient's  size, or iterative reconstruction technique.     FINDINGS:   Right kidney: No renal calculi. No dilatation of the intrarenal  collecting system or ureter. 0.4 cm low-attenuation lesion in the  interpolar region of the kidney, too small to characterize. No  additional renal lesions.     Left kidney: No renal calculi. No dilatation of the intrarenal  collecting system or ureter. 6.1 x 4.9 cm nonenhancing thin-walled  cystic mass in the interpolar region of the kidney. A single thin  septation is present in the medial aspect of the mass. A few  calcifications are present in the dependent aspect of the mass,  possibly representing milk of calcium. On the excretory phase images,  there is equivocal excreted contrast material within the dependent  portion of this cyst. No additional renal lesions.     Remainder of the upper abdomen: Two subcentimeter low-attenuation  hepatic lesions, too small to characterize. The spleen, pancreas and  adrenal glands are unremarkable. The gallbladder is present. The small  and large bowel  are normal in caliber. No enlarged lymph nodes or free  fluid in the upper abdomen. Tiny paraumbilical hernia containing fat.     Scan through the lower chest is unremarkable.                                                                      IMPRESSION: 6 cm minimally complicated left renal cyst, unchanged in  size since 5/31/2018. There is equivocal excreted contrast material  within the cyst on the excretory phase images, and therefore this  could represent a large calyceal diverticulum. This is a  benign-appearing type II cyst according to the Bosniak classification  system of renal cystic masses.     GEMA MAGALLANES MD    I reviewed these films with her in detail today.  It is clear that the cyst in the left kidney is unchanged in size.  There is some question as to whether it could represent a large calyceal diverticulum, but there is no evidence of stones within it.  The impression is this is a benign-appearing Bosniak 2 cyst.    My recommendations therefore are as follows  1.  The cyst is unchanged in size I see no reason for intervention; the indications for intervention would be if there was clear evidence that she was developing pain related to the cyst which is not the case at present, or if there were changes in the cyst which would indicate the possible presence of malignancy which does not appear to be the situation at present.  2.  With a past history of infections in the urine, however there is no evidence of recurrent infections at this time, the urinalysis today is negative except for trace of blood which is been consistently noted in the past.  3.  The pain in the left lower quadrant appears to be over the sigmoid colon and could be indicative of the possibility of diverticulitis.  She should continue to take roughage in the diet to help ameliorate this.    I carefully discussed the entire situation with the patient in detail today, went over all pertinent records and other radiologic and other  "studies and discussed the situation with her in detail.  I answered many questions    Plan: I will see her again in 1 year as she has imaging studies of the heart which include the kidney at that time.  She will be having an MRA in 1 year and if this does not include the kidney adequately we will also arrange for an ultrasound of the kidneys.    \"This dictation was performed with voice recognition software and may contain errors,  omissions and inadvertent word substitution.\"            Again, thank you for allowing me to participate in the care of your patient.      Sincerely,    Parviz Mijares MD      "

## 2019-06-24 NOTE — PROGRESS NOTES
History: It is a great pleasure to see this very pleasant 63-year-old lady in follow-up consultation today.  She has a history of recurrent urinary tract infections and also of the presence of a large cyst on the upper pole of the left kidney which has the characteristics of a Bosniak 2 cyst.  She is also now mentioning occasional discomfort in the left lower quadrant.  This does not seem to be related specifically to movement although it is relieved when she stands up.  However it is definitely not in the flank.  Her general health is otherwise stable.  She has had no further infections in the urine over the last year and she is not on any prophylactic antibiotic treatment.  Her general health is otherwise very stable    Past Medical History:   Diagnosis Date     Mumps        Social History     Socioeconomic History     Marital status:      Spouse name: None     Number of children: None     Years of education: None     Highest education level: None   Occupational History     None   Social Needs     Financial resource strain: None     Food insecurity:     Worry: None     Inability: None     Transportation needs:     Medical: None     Non-medical: None   Tobacco Use     Smoking status: Never Smoker     Smokeless tobacco: Never Used   Substance and Sexual Activity     Alcohol use: None     Drug use: None     Sexual activity: None   Lifestyle     Physical activity:     Days per week: None     Minutes per session: None     Stress: None   Relationships     Social connections:     Talks on phone: None     Gets together: None     Attends Hindu service: None     Active member of club or organization: None     Attends meetings of clubs or organizations: None     Relationship status: None     Intimate partner violence:     Fear of current or ex partner: None     Emotionally abused: None     Physically abused: None     Forced sexual activity: None   Other Topics Concern     Parent/sibling w/ CABG, MI or angioplasty  "before 65F 55M? Not Asked   Social History Narrative     None       History reviewed. No pertinent surgical history.    History reviewed. No pertinent family history.      Current Outpatient Medications:      simvastatin (ZOCOR) 20 MG tablet, Take 20 mg by mouth, Disp: , Rfl:     10 point ROS of systems including Constitutional, Eyes, Respiratory, Cardiovascular, Gastroenterology, Genitourinary, Integumentary, Muscularskeletal, Psychiatric were all negative except for pertinent positives noted in my HPI.    Examination:   /86   Pulse 100   Ht 1.626 m (5' 4\")   Wt 72.6 kg (160 lb)   SpO2 95%   BMI 27.46 kg/m    General Impression: Very pleasant lady in no acute distress, well-oriented in time place and person  Mental Status: Normal.  HEENT.  There is no clinical evidence of jaundice, the mucous membranes are normal  Skin: Skin is otherwise normal to examination  Respiratory System: The respiratory cycle is normal  Lymph Nodes: Not examined  Back/Flank Tenderness: There is no flank tenderness  Cardiovascular System: There is no significant peripheral pitting edema  Abdominal Examination: Examination of the abdomen reveals a mildly obese abdomen, there are no palpable masses, there is some mild left lower quadrant tenderness over the region of the sigmoid colon but no evidence of rebound or any other significant features in the abdomen  Extremities: The extremities are otherwise unremarkable  Genitial: Not examined  Rectal Examination: Not examined  Neurologic System: There are no focal abnormal clinical neurological signs in the central, or peripheral nerve systems    Impression: I reviewed the situation and went over the current CT scan with her carefully today.  CT ABDOMEN WITHOUT AND WITH CONTRAST   6/3/2019 7:44 AM      HISTORY: Acquired renal cyst.     COMPARISON: 5/31/2018 - [facility] [place], [place]. The report from that study describes a 6.1 x 4.5 x 4.0 cm  left renal " cyst with thick posterior peripheral calcification.     TECHNIQUE: Prior to the administration of intravenous contrast,  helical sections were acquired through the kidneys. Following the  uneventful administration of 78 mL Isovue-370 intravenous contrast,  helical sections were acquired from the top of the diaphragm through  the iliac crests. Excretory phase images were also acquired through  the kidneys. Coronal and sagittal reconstructions were generated.  Radiation dose for this scan was reduced using automated exposure  control, adjustment of the mA and/or kV according to the patient's  size, or iterative reconstruction technique.     FINDINGS:   Right kidney: No renal calculi. No dilatation of the intrarenal  collecting system or ureter. 0.4 cm low-attenuation lesion in the  interpolar region of the kidney, too small to characterize. No  additional renal lesions.     Left kidney: No renal calculi. No dilatation of the intrarenal  collecting system or ureter. 6.1 x 4.9 cm nonenhancing thin-walled  cystic mass in the interpolar region of the kidney. A single thin  septation is present in the medial aspect of the mass. A few  calcifications are present in the dependent aspect of the mass,  possibly representing milk of calcium. On the excretory phase images,  there is equivocal excreted contrast material within the dependent  portion of this cyst. No additional renal lesions.     Remainder of the upper abdomen: Two subcentimeter low-attenuation  hepatic lesions, too small to characterize. The spleen, pancreas and  adrenal glands are unremarkable. The gallbladder is present. The small  and large bowel are normal in caliber. No enlarged lymph nodes or free  fluid in the upper abdomen. Tiny paraumbilical hernia containing fat.     Scan through the lower chest is unremarkable.                                                                      IMPRESSION: 6 cm minimally complicated left renal cyst, unchanged in  size  since 5/31/2018. There is equivocal excreted contrast material  within the cyst on the excretory phase images, and therefore this  could represent a large calyceal diverticulum. This is a  benign-appearing type II cyst according to the Bosniak classification  system of renal cystic masses.     GEMA MAGALLANES MD    I reviewed these films with her in detail today.  It is clear that the cyst in the left kidney is unchanged in size.  There is some question as to whether it could represent a large calyceal diverticulum, but there is no evidence of stones within it.  The impression is this is a benign-appearing Bosniak 2 cyst.    My recommendations therefore are as follows  1.  The cyst is unchanged in size I see no reason for intervention; the indications for intervention would be if there was clear evidence that she was developing pain related to the cyst which is not the case at present, or if there were changes in the cyst which would indicate the possible presence of malignancy which does not appear to be the situation at present.  2.  With a past history of infections in the urine, however there is no evidence of recurrent infections at this time, the urinalysis today is negative except for trace of blood which is been consistently noted in the past.  3.  The pain in the left lower quadrant appears to be over the sigmoid colon and could be indicative of the possibility of diverticulitis.  She should continue to take roughage in the diet to help ameliorate this.    I carefully discussed the entire situation with the patient in detail today, went over all pertinent records and other radiologic and other studies and discussed the situation with her in detail.  I answered many questions    Plan: I will see her again in 1 year as she has imaging studies of the heart which include the kidney at that time.  She will be having an MRA in 1 year and if this does not include the kidney adequately we will also arrange for an  "ultrasound of the kidneys.    \"This dictation was performed with voice recognition software and may contain errors,  omissions and inadvertent word substitution.\"          "

## 2019-08-15 ENCOUNTER — TRANSFERRED RECORDS (OUTPATIENT)
Dept: HEALTH INFORMATION MANAGEMENT | Facility: CLINIC | Age: 64
End: 2019-08-15

## 2019-09-20 ENCOUNTER — TELEPHONE (OUTPATIENT)
Dept: UROLOGY | Facility: CLINIC | Age: 64
End: 2019-09-20

## 2019-09-20 DIAGNOSIS — R82.90 CLOUDY URINE: Primary | ICD-10-CM

## 2019-09-20 NOTE — TELEPHONE ENCOUNTER
Patient may make a nurse appointment sooner to r/o infection. Called patient and LM. Will wait for a return phone call to offer that option.     Tram eFliz LPN

## 2019-09-20 NOTE — TELEPHONE ENCOUNTER
Informed patient that a sample could be dropped-off at any [facility] lab to check a UAUC at her convenience.     Tram Feliz LPN

## 2019-09-20 NOTE — TELEPHONE ENCOUNTER
[facility] Call Center    Phone Message    May a detailed message be left on voicemail: yes    Reason for Call: Other: Patient has scheduled an appointment for cloudy/bubbly urine on 10/15. would like to see if this is an appropriate time to wait for appointment. please follow up w/ pt.     Action Taken: Message routed to:  Other: ua uro

## 2019-09-23 DIAGNOSIS — R82.90 CLOUDY URINE: ICD-10-CM

## 2019-09-23 LAB
ALBUMIN UR-MCNC: ABNORMAL MG/DL
APPEARANCE UR: ABNORMAL
BILIRUB UR QL STRIP: NEGATIVE
COLOR UR AUTO: YELLOW
GLUCOSE UR STRIP-MCNC: NEGATIVE MG/DL
HGB UR QL STRIP: ABNORMAL
KETONES UR STRIP-MCNC: NEGATIVE MG/DL
LEUKOCYTE ESTERASE UR QL STRIP: ABNORMAL
NITRATE UR QL: NEGATIVE
PH UR STRIP: 5 PH (ref 5–7)
SOURCE: ABNORMAL
SP GR UR STRIP: 1.01 (ref 1–1.03)
UROBILINOGEN UR STRIP-ACNC: 0.2 EU/DL (ref 0.2–1)

## 2019-09-23 PROCEDURE — 87086 URINE CULTURE/COLONY COUNT: CPT | Performed by: UROLOGY

## 2019-09-23 PROCEDURE — 81003 URINALYSIS AUTO W/O SCOPE: CPT | Performed by: UROLOGY

## 2019-09-24 LAB
BACTERIA SPEC CULT: NO GROWTH
SPECIMEN SOURCE: NORMAL

## 2019-09-25 NOTE — TELEPHONE ENCOUNTER
Called patient and informed her that urine culture did not show any growth. She will keep appointment with MD to discuss cloudy/bubbly urine.     Tram Feliz LPN

## 2019-09-25 NOTE — TELEPHONE ENCOUNTER
[facility] Call Center    Phone Message    May a detailed message be left on voicemail: yes    Reason for Call: Requesting Results   Name/type of test: UA   Date of test: 9/23/19  Was test done at a location other than [facility] (Please fill in the location if not [facility])?: No      Action Taken: Message routed to:  [facility] [place]

## 2019-10-15 ENCOUNTER — OFFICE VISIT (OUTPATIENT)
Dept: UROLOGY | Facility: CLINIC | Age: 64
End: 2019-10-15
Payer: COMMERCIAL

## 2019-10-15 VITALS
HEIGHT: 64 IN | BODY MASS INDEX: 24.75 KG/M2 | DIASTOLIC BLOOD PRESSURE: 68 MMHG | WEIGHT: 145 LBS | SYSTOLIC BLOOD PRESSURE: 120 MMHG | HEART RATE: 80 BPM

## 2019-10-15 DIAGNOSIS — R39.89 PNEUMATURIA: ICD-10-CM

## 2019-10-15 DIAGNOSIS — R82.90 CLOUDY URINE: Primary | ICD-10-CM

## 2019-10-15 DIAGNOSIS — Z87.440 HISTORY OF RECURRENT UTIS: ICD-10-CM

## 2019-10-15 LAB
ALBUMIN UR-MCNC: 100 MG/DL
APPEARANCE UR: CLEAR
BILIRUB UR QL STRIP: NEGATIVE
COLOR UR AUTO: YELLOW
GLUCOSE UR STRIP-MCNC: NEGATIVE MG/DL
HGB UR QL STRIP: ABNORMAL
KETONES UR STRIP-MCNC: 15 MG/DL
LEUKOCYTE ESTERASE UR QL STRIP: ABNORMAL
NITRATE UR QL: POSITIVE
PH UR STRIP: 5.5 PH (ref 5–7)
SOURCE: ABNORMAL
SP GR UR STRIP: 1.02 (ref 1–1.03)
UROBILINOGEN UR STRIP-ACNC: 0.2 EU/DL (ref 0.2–1)

## 2019-10-15 PROCEDURE — 87088 URINE BACTERIA CULTURE: CPT | Performed by: UROLOGY

## 2019-10-15 PROCEDURE — 87186 SC STD MICRODIL/AGAR DIL: CPT | Performed by: UROLOGY

## 2019-10-15 PROCEDURE — 87086 URINE CULTURE/COLONY COUNT: CPT | Performed by: UROLOGY

## 2019-10-15 PROCEDURE — 81003 URINALYSIS AUTO W/O SCOPE: CPT | Performed by: UROLOGY

## 2019-10-15 PROCEDURE — 99213 OFFICE O/P EST LOW 20 MIN: CPT | Performed by: UROLOGY

## 2019-10-15 RX ORDER — DIPHENOXYLATE HCL/ATROPINE 2.5-.025MG
TABLET ORAL
Refills: 0 | COMMUNITY
Start: 2019-08-20 | End: 2022-08-18

## 2019-10-15 RX ORDER — SULFAMETHOXAZOLE/TRIMETHOPRIM 800-160 MG
1 TABLET ORAL 2 TIMES DAILY
Qty: 20 TABLET | Refills: 0 | Status: SHIPPED | OUTPATIENT
Start: 2019-10-15 | End: 2022-08-18

## 2019-10-15 ASSESSMENT — MIFFLIN-ST. JEOR: SCORE: 1202.72

## 2019-10-15 ASSESSMENT — PAIN SCALES - GENERAL: PAINLEVEL: MILD PAIN (2)

## 2019-10-15 NOTE — LETTER
10/15/2019       RE: Mari Turner  [place] [address]     Dear Colleague,    Thank you for referring your patient, Mari Turner, to the [facility]OLOG[facility] CLIN[facility] [place] at [facility]. Please see a copy of my visit note below.    History: this is a great pleasure to see this very pleasant 64-year-old lady in follow-up consultation today.  She has a history of recurrent urinary tract infections and also of the presence of a large cyst on the upper pole of the left kidney which has the characteristics of a Bosniak 2 cyst.  She is also now mentioning occasional discomfort in the left lower quadrant.  This does not seem to be related specifically to movement although it is relieved when she stands up.  However it is definitely not in the flank.  Her general health is otherwise stable  We are now observing moderate amount of blood in the urine  And the urine today is nitrite positive.  She is observing now cloudy urine and thinks she is sitting and bubbles in the urine as well.  She continues to have left lower quadrant discomfort.  A CT scan was done in June of this year we did not include the pelvis.  A general health is otherwise stable      Past Medical History:   Diagnosis Date     Mumps        Social History     Socioeconomic History     Marital status:      Spouse name: None     Number of children: None     Years of education: None     Highest education level: None   Occupational History     None   Social Needs     Financial resource strain: None     Food insecurity:     Worry: None     Inability: None     Transportation needs:     Medical: None     Non-medical: None   Tobacco Use     Smoking status: Never Smoker     Smokeless tobacco: Never Used   Substance and Sexual Activity     Alcohol use: Yes     Drug use: Never     Sexual activity: Not Currently   Lifestyle     Physical activity:     Days per week: None     Minutes per  "session: None     Stress: None   Relationships     Social connections:     Talks on phone: None     Gets together: None     Attends Lutheran service: None     Active member of club or organization: None     Attends meetings of clubs or organizations: None     Relationship status: None     Intimate partner violence:     Fear of current or ex partner: None     Emotionally abused: None     Physically abused: None     Forced sexual activity: None   Other Topics Concern     Parent/sibling w/ CABG, MI or angioplasty before 65F 55M? Not Asked   Social History Narrative     None       History reviewed. No pertinent surgical history.    Family History   Problem Relation Age of Onset     Cancer Mother          Current Outpatient Medications:      simvastatin (ZOCOR) 20 MG tablet, Take 20 mg by mouth, Disp: , Rfl:      sulfamethoxazole-trimethoprim (BACTRIM DS/SEPTRA DS) 800-160 MG tablet, Take 1 tablet by mouth 2 times daily, Disp: 20 tablet, Rfl: 0     diphenoxylate-atropine (LOMOTIL) 2.5-0.025 MG tablet, TAKE 2 TABLETS (5 MG) BY MOUTH TWICE A DAY AS NEEDED, Disp: , Rfl: 0    10 point ROS of systems including Constitutional, Eyes, Respiratory, Cardiovascular, Gastroenterology, Genitourinary, Integumentary, Muscularskeletal, Psychiatric and Neurologic were all negative except for pertinent positives noted in my HPI.    Examination:   /68   Pulse 80   Ht 1.626 m (5' 4\")   Wt 65.8 kg (145 lb)   BMI 24.89 kg/m     General Impression: Very pleasant patient in no acute distress, well-oriented in time place and person and quite conversational  Mental Status: normal  HEENT: Extraocular movements intact.  No clinical evidence of jaundice on examination of eyes.  Mucous membranes are unremarkable  Skin: Warm.  No other abnormalities  Respiratory System: Unlabored on room air.  Respiratory cycle normal  Lymph Nodes: the negative  Back/Flank Tenderness: there is no significant flank tenderness  Cardiovascular System: No " "significant peripheral pitting edema  Abdominal Examination: slight left lower quadrant discomfort  Extremities.  unremarkable  Genitial: not examined  Rectal Examination: no examined  Neurologic System: There are no significant acute abnormal neurological signs in the central or peripheral nervous systems    Impression: I am a little concerned about her symptoms.  She not only has dysuria but now has cavernous of the urine and ppneumaturia..  This, combined with the discomfort in the left lower quadrant could indicate diverticulitis with a fistula into the bladder.  She has not observed fragments of tissue in the urine.  However, we will clearly need to exclude  The possibility of a vesicocolic fistula.  I'm going to arrange for a CT of the abdomen and pelvis will nowwithout contrast, we will culture the urine today and start her on Bactrim double strength twice a day for the next 10 days, we will then do a cystoscopy when we are sure the urine is clear infection.  I did discuss this very carefully with her in detail today.  I answered all her questions    Plan: CT abdomen and pelvis with and without contrast, cystoscopy.  Start her on Bactrim 1 double strength twice a day for 10 days.  We will monitor the results of the culture and change medication as necessary    Time: 515minutes with greater than 50% in discussion and consultation    \"This dictation was performed with voice recognition software and may contain errors,  omissions and inadvertent word substitution.\"    Again, thank you for allowing me to participate in the care of your patient.      Sincerely,    Parviz Mijares MD      "

## 2019-10-15 NOTE — PROGRESS NOTES
History: this is a great pleasure to see this very pleasant 64-year-old lady in follow-up consultation today.  She has a history of recurrent urinary tract infections and also of the presence of a large cyst on the upper pole of the left kidney which has the characteristics of a Bosniak 2 cyst.  She is also now mentioning occasional discomfort in the left lower quadrant.  This does not seem to be related specifically to movement although it is relieved when she stands up.  However it is definitely not in the flank.  Her general health is otherwise stable  We are now observing moderate amount of blood in the urine  And the urine today is nitrite positive.  She is observing now cloudy urine and thinks she is sitting and bubbles in the urine as well.  She continues to have left lower quadrant discomfort.  A CT scan was done in June of this year we did not include the pelvis.  A general health is otherwise stable      Past Medical History:   Diagnosis Date     Mumps        Social History     Socioeconomic History     Marital status:      Spouse name: None     Number of children: None     Years of education: None     Highest education level: None   Occupational History     None   Social Needs     Financial resource strain: None     Food insecurity:     Worry: None     Inability: None     Transportation needs:     Medical: None     Non-medical: None   Tobacco Use     Smoking status: Never Smoker     Smokeless tobacco: Never Used   Substance and Sexual Activity     Alcohol use: Yes     Drug use: Never     Sexual activity: Not Currently   Lifestyle     Physical activity:     Days per week: None     Minutes per session: None     Stress: None   Relationships     Social connections:     Talks on phone: None     Gets together: None     Attends Spiritism service: None     Active member of club or organization: None     Attends meetings of clubs or organizations: None     Relationship status: None     Intimate partner  "violence:     Fear of current or ex partner: None     Emotionally abused: None     Physically abused: None     Forced sexual activity: None   Other Topics Concern     Parent/sibling w/ CABG, MI or angioplasty before 65F 55M? Not Asked   Social History Narrative     None       History reviewed. No pertinent surgical history.    Family History   Problem Relation Age of Onset     Cancer Mother          Current Outpatient Medications:      simvastatin (ZOCOR) 20 MG tablet, Take 20 mg by mouth, Disp: , Rfl:      sulfamethoxazole-trimethoprim (BACTRIM DS/SEPTRA DS) 800-160 MG tablet, Take 1 tablet by mouth 2 times daily, Disp: 20 tablet, Rfl: 0     diphenoxylate-atropine (LOMOTIL) 2.5-0.025 MG tablet, TAKE 2 TABLETS (5 MG) BY MOUTH TWICE A DAY AS NEEDED, Disp: , Rfl: 0    10 point ROS of systems including Constitutional, Eyes, Respiratory, Cardiovascular, Gastroenterology, Genitourinary, Integumentary, Muscularskeletal, Psychiatric and Neurologic were all negative except for pertinent positives noted in my HPI.    Examination:   /68   Pulse 80   Ht 1.626 m (5' 4\")   Wt 65.8 kg (145 lb)   BMI 24.89 kg/m    General Impression: Very pleasant patient in no acute distress, well-oriented in time place and person and quite conversational  Mental Status: normal  HEENT: Extraocular movements intact.  No clinical evidence of jaundice on examination of eyes.  Mucous membranes are unremarkable  Skin: Warm.  No other abnormalities  Respiratory System: Unlabored on room air.  Respiratory cycle normal  Lymph Nodes: the negative  Back/Flank Tenderness: there is no significant flank tenderness  Cardiovascular System: No significant peripheral pitting edema  Abdominal Examination: slight left lower quadrant discomfort  Extremities.  unremarkable  Genitial: not examined  Rectal Examination: no examined  Neurologic System: There are no significant acute abnormal neurological signs in the central or peripheral nervous " "systems    Impression: I am a little concerned about her symptoms.  She not only has dysuria but now has cavernous of the urine and ppneumaturia..  This, combined with the discomfort in the left lower quadrant could indicate diverticulitis with a fistula into the bladder.  She has not observed fragments of tissue in the urine.  However, we will clearly need to exclude  The possibility of a vesicocolic fistula.  I'm going to arrange for a CT of the abdomen and pelvis will nowwithout contrast, we will culture the urine today and start her on Bactrim double strength twice a day for the next 10 days, we will then do a cystoscopy when we are sure the urine is clear infection.  I did discuss this very carefully with her in detail today.  I answered all her questions    Plan: CT abdomen and pelvis with and without contrast, cystoscopy.  Start her on Bactrim 1 double strength twice a day for 10 days.  We will monitor the results of the culture and change medication as necessary    Time: 515minutes with greater than 50% in discussion and consultation    \"This dictation was performed with voice recognition software and may contain errors,  omissions and inadvertent word substitution.\"      "

## 2019-10-15 NOTE — NURSING NOTE
Was seen at [facility] for UTI was treated with course macrobid and sx did not resolve then treated with Bactrim DS. States her urine is cloudy and bubbles. Urine culture 2 weeks ago was negative. Also having some  flank and low back pain.Nicol Velarde LPN

## 2019-10-17 LAB
BACTERIA SPEC CULT: ABNORMAL
Lab: ABNORMAL
SPECIMEN SOURCE: ABNORMAL

## 2019-10-24 DIAGNOSIS — Z87.440 HISTORY OF RECURRENT UTIS: Primary | ICD-10-CM

## 2019-10-28 ENCOUNTER — TELEPHONE (OUTPATIENT)
Dept: UROLOGY | Facility: CLINIC | Age: 64
End: 2019-10-28

## 2019-10-28 ENCOUNTER — HOSPITAL ENCOUNTER (OUTPATIENT)
Dept: CT IMAGING | Facility: CLINIC | Age: 64
Discharge: HOME | End: 2019-10-28
Attending: UROLOGY | Admitting: UROLOGY
Payer: COMMERCIAL

## 2019-10-28 ENCOUNTER — OFFICE VISIT (OUTPATIENT)
Dept: UROLOGY | Facility: CLINIC | Age: 64
End: 2019-10-28
Payer: COMMERCIAL

## 2019-10-28 VITALS
HEIGHT: 64 IN | HEART RATE: 76 BPM | DIASTOLIC BLOOD PRESSURE: 80 MMHG | OXYGEN SATURATION: 98 % | BODY MASS INDEX: 24.75 KG/M2 | SYSTOLIC BLOOD PRESSURE: 140 MMHG | WEIGHT: 145 LBS

## 2019-10-28 DIAGNOSIS — R31.9 URINARY TRACT INFECTION WITH HEMATURIA, SITE UNSPECIFIED: ICD-10-CM

## 2019-10-28 DIAGNOSIS — R82.90 CLOUDY URINE: ICD-10-CM

## 2019-10-28 DIAGNOSIS — Z79.2 PROPHYLACTIC ANTIBIOTIC: Primary | ICD-10-CM

## 2019-10-28 DIAGNOSIS — N39.0 URINARY TRACT INFECTION WITH HEMATURIA, SITE UNSPECIFIED: ICD-10-CM

## 2019-10-28 DIAGNOSIS — Z87.440 HISTORY OF RECURRENT UTIS: ICD-10-CM

## 2019-10-28 DIAGNOSIS — R39.89 PNEUMATURIA: ICD-10-CM

## 2019-10-28 LAB
ALBUMIN UR-MCNC: NEGATIVE MG/DL
APPEARANCE UR: CLEAR
BILIRUB UR QL STRIP: NEGATIVE
COLOR UR AUTO: YELLOW
GLUCOSE UR STRIP-MCNC: NEGATIVE MG/DL
HGB UR QL STRIP: NEGATIVE
KETONES UR STRIP-MCNC: NEGATIVE MG/DL
LEUKOCYTE ESTERASE UR QL STRIP: NEGATIVE
NITRATE UR QL: NEGATIVE
PH UR STRIP: 5 PH (ref 5–7)
SOURCE: NORMAL
SP GR UR STRIP: <=1.005 (ref 1–1.03)
UROBILINOGEN UR STRIP-ACNC: 0.2 EU/DL (ref 0.2–1)

## 2019-10-28 PROCEDURE — 74178 CT ABD&PLV WO CNTR FLWD CNTR: CPT

## 2019-10-28 PROCEDURE — 81003 URINALYSIS AUTO W/O SCOPE: CPT | Performed by: UROLOGY

## 2019-10-28 PROCEDURE — 25000125 ZZHC RX 250: Performed by: UROLOGY

## 2019-10-28 PROCEDURE — 52000 CYSTOURETHROSCOPY: CPT | Performed by: UROLOGY

## 2019-10-28 PROCEDURE — 25000128 H RX IP 250 OP 636: Performed by: UROLOGY

## 2019-10-28 PROCEDURE — 99214 OFFICE O/P EST MOD 30 MIN: CPT | Mod: 25 | Performed by: UROLOGY

## 2019-10-28 RX ORDER — SULFAMETHOXAZOLE AND TRIMETHOPRIM 400; 80 MG/1; MG/1
1 TABLET ORAL DAILY
Qty: 90 TABLET | Refills: 1 | Status: SHIPPED | OUTPATIENT
Start: 2019-10-28 | End: 2022-08-18

## 2019-10-28 RX ORDER — CIPROFLOXACIN 500 MG/1
500 TABLET, FILM COATED ORAL ONCE
Qty: 1 TABLET | Refills: 0 | Status: SHIPPED | OUTPATIENT
Start: 2019-10-28 | End: 2019-10-28

## 2019-10-28 RX ORDER — IOPAMIDOL 755 MG/ML
100 INJECTION, SOLUTION INTRAVASCULAR ONCE
Status: COMPLETED | OUTPATIENT
Start: 2019-10-28 | End: 2019-10-28

## 2019-10-28 RX ADMIN — IOPAMIDOL 100 ML: 755 INJECTION, SOLUTION INTRAVENOUS at 13:47

## 2019-10-28 RX ADMIN — SODIUM CHLORIDE 60 ML: 9 INJECTION, SOLUTION INTRAVENOUS at 13:51

## 2019-10-28 ASSESSMENT — MIFFLIN-ST. JEOR: SCORE: 1202.72

## 2019-10-28 ASSESSMENT — PAIN SCALES - GENERAL: PAINLEVEL: NO PAIN (0)

## 2019-10-28 NOTE — TELEPHONE ENCOUNTER
[facility] Call Center    Phone Message    May a detailed message be left on voicemail: yes    Reason for Call: Other: please call pt back to discuss what side effects she should be looking out for as a potential result of the procedure. pt said it can just be info over voicemail if she doesn't answer.     Action Taken: Message routed to:  Other: ua uro

## 2019-10-28 NOTE — LETTER
10/28/2019       RE: Mari Turner  [place] [address]     Dear Colleague,    Thank you for referring your patient, Mari Turner, to the [facility] UROLOG[facility]IC [place] at [facility]. Please see a copy of my visit note below.    It is a great pleasure to see this very pleasant 64-year-old lady in follow-up consultation today.  She returns today for cystoscopy.  We have also performed a CT urogram.  She was found on this occasion to have a urinary tract infection with a very significant growth of E. coli which is now been effectively treated and today the urinalysis is quite clear.  CT ABDOMEN AND PELVIS WITHOUT AND WITH CONTRAST  10/28/2019 2:04 PM       HISTORY: Cloudy urine. History of recurrent UTIs. Pneumaturia.     COMPARISON: Renea 3, 2019     TECHNIQUE: Volumetric helical acquisition of CT images from the lung  bases through the symphysis pubis before and after the uneventful  administration of 100 mL Isovue-370 intravenous contrast. Radiation  dose for this scan was reduced using automated exposure control,  adjustment of the mA and/or kV according to patient size, or iterative  reconstruction technique.     FINDINGS: At least three stones measuring up to 8 mm are seen in the  left kidney. There is no hydroureter or hydronephrosis. There is no  perinephric fat stranding. Kidneys are normal in size and  configuration. No suspicious filling defects seen in the opacified  intrarenal collecting systems, ureters, or bladder to suggest a  urothelial malignancy. Left renal cyst is increased in size, now  measuring 8 cm, previously 5.7 cm. This appears thick-walled. An  infected cyst given the history of recurrent urinary tract infections  and pneumaturia is a consideration in the differential. The liver,  spleen, adrenal glands, and pancreas demonstrate no worrisome focal  lesion.  Pelvic structures unremarkable. Normal caliber  aorta.  Unremarkable gallbladder.  No free fluid. No free air in the abdomen.  There are no abdominal or pelvic lymph nodes that are abnormal by size  criteria.  There are no dilated loops of small intestine or large  bowel to suggest ileus or obstruction.The visualized lung bases are  unremarkable. Bone windows reveal no destructive lesions.                                                                        IMPRESSION:   1. No evidence for renal, ureteral, or bladder calculi.  2. No masses or suspicious filling defects within the opacified  urinary collecting system.  3. Increased size of the thick-walled cystic lesion containing  calcifications in the mid left kidney; an infected cyst and/or abscess  is a consideration in the differential given the hi       Procedure.  Cystoscopy.   Surgeon.    Dyllan  Anesthesia.  Local anesthesia.  Description.  With the patient in the supine position, with the genital area prepped and draped in the customary fashion, with local anesthetic and the urethra, the flexible cystoscope was Inserted.  The urethra is unremarkable.  The interior of the bladder is carefully inspected.  There is no trabeculation.  There is no evidence of a fistula into the bladder.  There is no evidence of neoplasm or stone in the bladder.  There are no other remarkable features.    Impression.  There were 3 issues with addressed today with both review of the radiologic studies and a cystoscopy  1.  Pneumaturia.  We found no evidence of vesicocolic fistula or any other fistula into the bladder.  2.  History of recurrent urinary tract infection we found no obvious problem in the bladder that would contribute to these infections.  I am going to recommend she take Septra 1 single strength once a day for the next 6 months as prophylaxis against infection.  If she gets further infections after that or even during that she is to see me promptly.  3.  I noticed to on the CT scan that the cystic lesion in the  "left kidney has enlarged over the last 4 months which is a little concerning there is some calcium in the wall of this cyst which is been considered Bosniak 2 with a septation within it.  Her clinical situation does not suggest that this is an abscess.  I am concerned about whether they could be the possibility of a malignancy within the wall of the cyst although I do not think this likely.  I am going to have a discussion with 1 of my colleagues about this as to whether we should aspirate the cyst for malignant cells or consider more significant involvement including possibly surgery.    I did go this very carefully with the patient in detail today.  There are a number of issues clearly from what are noted above that we had to discuss following the procedure.  I answered all her questions.    Plan.  She will start one single strength Septra daily for the next 6 months.  I would have discussions with my partner Dr. Jef Young about the cystic lesion in the kidney as to his opinion about whether further measures should be taken or whether we should continue observation.    Time.  We did have a lengthy discussion following the procedure for 25 minutes which included discussions of the radiologic studies, the findings of cystoscopy, and then careful and pragmatic discussions about the 3 issues which have been noted in detail above    \"This dictation was performed with voice recognition software and may contain errors,  omissions and inadvertent word substitution.\"      Again, thank you for allowing me to participate in the care of your patient.      Sincerely,    Parviz Mijares MD      "

## 2019-10-28 NOTE — NURSING NOTE
Chief Complaint   Patient presents with     Cystoscopy     patient is here for cysto for recurrent UTI.      Prior to the start of the procedure and with procedural staff participation, I verbally confirmed the patient s identity using two indicators, relevant allergies, that the procedure was appropriate and matched the consent or emergent situation, and that the correct equipment/implants were available. Immediately prior to starting the procedure I conducted the Time Out with the procedural staff and re-confirmed the patient s name, procedure, and site/side. I have wiped the patient off with the povidone-Iodine solution, draped them,  used Lidocaine hydrochloride jelly, and instilled sterile water into the bladder. (The Joint Commission universal protocol was followed.)  Yes    Sedation (Moderate or Deep): None    Suze Moore, CMA

## 2019-10-30 NOTE — TELEPHONE ENCOUNTER
Called patient and left a detailed message on verified VM. Instructed her to call back, if she experiences any symptoms or has any questions.     Tram Feliz LPN

## 2019-12-27 ENCOUNTER — HOSPITAL ENCOUNTER (OUTPATIENT)
Dept: ULTRASOUND IMAGING | Facility: CLINIC | Age: 64
Discharge: HOME | End: 2019-12-27
Attending: UROLOGY | Admitting: UROLOGY
Payer: COMMERCIAL

## 2019-12-27 VITALS
OXYGEN SATURATION: 99 % | SYSTOLIC BLOOD PRESSURE: 138 MMHG | DIASTOLIC BLOOD PRESSURE: 79 MMHG | RESPIRATION RATE: 14 BRPM | HEART RATE: 84 BPM

## 2019-12-27 DIAGNOSIS — N28.1 RENAL CYST, LEFT: ICD-10-CM

## 2019-12-27 LAB
ERYTHROCYTE [DISTWIDTH] IN BLOOD BY AUTOMATED COUNT: 13.2 % (ref 10–15)
GRAM STN SPEC: NORMAL
GRAM STN SPEC: NORMAL
HCT VFR BLD AUTO: 42.1 % (ref 35–47)
HGB BLD-MCNC: 13.4 G/DL (ref 11.7–15.7)
INR PPP: 0.95 (ref 0.86–1.14)
MCH RBC QN AUTO: 30.2 PG (ref 26.5–33)
MCHC RBC AUTO-ENTMCNC: 31.8 G/DL (ref 31.5–36.5)
MCV RBC AUTO: 95 FL (ref 78–100)
PLATELET # BLD AUTO: 241 10E9/L (ref 150–450)
RBC # BLD AUTO: 4.43 10E12/L (ref 3.8–5.2)
SPECIMEN SOURCE: NORMAL
WBC # BLD AUTO: 5.4 10E9/L (ref 4–11)

## 2019-12-27 PROCEDURE — 88305 TISSUE EXAM BY PATHOLOGIST: CPT | Mod: 26 | Performed by: RADIOLOGY

## 2019-12-27 PROCEDURE — 00000155 ZZHCL STATISTIC H-CELL BLOCK W/STAIN: Performed by: RADIOLOGY

## 2019-12-27 PROCEDURE — 88112 CYTOPATH CELL ENHANCE TECH: CPT | Mod: 26 | Performed by: RADIOLOGY

## 2019-12-27 PROCEDURE — 85027 COMPLETE CBC AUTOMATED: CPT | Performed by: RADIOLOGY

## 2019-12-27 PROCEDURE — 87070 CULTURE OTHR SPECIMN AEROBIC: CPT | Performed by: RADIOLOGY

## 2019-12-27 PROCEDURE — 25000125 ZZHC RX 250: Performed by: RADIOLOGY

## 2019-12-27 PROCEDURE — 87205 SMEAR GRAM STAIN: CPT | Performed by: RADIOLOGY

## 2019-12-27 PROCEDURE — 88305 TISSUE EXAM BY PATHOLOGIST: CPT | Performed by: RADIOLOGY

## 2019-12-27 PROCEDURE — 00000102 ZZHCL STATISTIC CYTO WRIGHT STAIN TC: Performed by: RADIOLOGY

## 2019-12-27 PROCEDURE — 87075 CULTR BACTERIA EXCEPT BLOOD: CPT | Performed by: RADIOLOGY

## 2019-12-27 PROCEDURE — 85610 PROTHROMBIN TIME: CPT | Performed by: RADIOLOGY

## 2019-12-27 PROCEDURE — 88112 CYTOPATH CELL ENHANCE TECH: CPT | Performed by: RADIOLOGY

## 2019-12-27 PROCEDURE — 76942 ECHO GUIDE FOR BIOPSY: CPT

## 2019-12-27 RX ORDER — DEXTROSE MONOHYDRATE 25 G/50ML
25-50 INJECTION, SOLUTION INTRAVENOUS
Status: CANCELLED | OUTPATIENT
Start: 2019-12-27

## 2019-12-27 RX ORDER — NICOTINE POLACRILEX 4 MG
15-30 LOZENGE BUCCAL
Status: CANCELLED | OUTPATIENT
Start: 2019-12-27

## 2019-12-27 RX ADMIN — LIDOCAINE HYDROCHLORIDE 10 ML: 10 INJECTION, SOLUTION EPIDURAL; INFILTRATION; INTRACAUDAL; PERINEURAL at 08:28

## 2019-12-27 NOTE — PROCEDURES
[facility]    Procedure: Left renal cyst aspiration.   Date/Time: 12/27/2019 8:45 AM  Performed by: Nicol Castelan DO  Authorized by: Nicol Castelan DO     UNIVERSAL PROTOCOL   Site Marked: NA  Prior Images Obtained and Reviewed:  Yes  Required items: Required blood products, implants, devices and special equipment available    Patient identity confirmed:  Verbally with patient, arm band, provided demographic data and hospital-assigned identification number  Patient was reevaluated immediately before administering moderate or deep sedation or anesthesia  Confirmation Checklist:  Patient's identity using two indicators, relevant allergies, procedure was appropriate and matched the consent or emergent situation and correct equipment/implants were available  Time out: Immediately prior to the procedure a time out was called    Universal Protocol: the Joint Commission Universal Protocol was followed    Preparation: Patient was prepped and draped in usual sterile fashion           ANESTHESIA    Anesthesia: Local infiltration  Local Anesthetic:  Lidocaine 1% without epinephrine      SEDATION    Patient Sedated: No    Sedation Type:  Moderate (conscious) sedation  Sedation:  Fentanyl and midazolam  See dictated procedure note for full details.  Findings: 150 mL cloudy tan fluid aspirated, tolerated well    Specimens: none and fluid and/or tissue for laboratory analysis and culture    Complications: None    Condition: Stable    Plan: Discharge home when criteria is met.     PROCEDURE   Patient Tolerance:  Patient tolerated the procedure well with no immediate complications    Length of time physician/provider present for 1:1 monitoring during sedation: 0

## 2019-12-27 NOTE — PROGRESS NOTES
Consent for left kidney cyst aspiration obtained by DR. Castelan.  This Am lab values reviewed.  Pt tolerated 150 ml's pale yellow cloudy fluid well.  No IV sedation needed.  Sterile dressing to site.  Specimen sent as ordered and for cultures per order DR. Castelan.  Pt to recovery area with puncture site down and comfortable.  Site care and discharge in 1 hour reviewed and understood.

## 2019-12-27 NOTE — PROGRESS NOTES
Pt painfree.  Vital signs stable.  Dressings dry. Discharged ambulatory with friend with no evident complications.

## 2019-12-30 LAB — COPATH REPORT: NORMAL

## 2019-12-31 LAB
BACTERIA SPEC CULT: NO GROWTH
SPECIMEN SOURCE: NORMAL

## 2020-01-03 LAB
BACTERIA SPEC CULT: NORMAL
Lab: NORMAL
SPECIMEN SOURCE: NORMAL

## 2020-01-07 DIAGNOSIS — Z87.440 HISTORY OF RECURRENT UTIS: Primary | ICD-10-CM

## 2020-01-14 ENCOUNTER — OFFICE VISIT (OUTPATIENT)
Dept: UROLOGY | Facility: CLINIC | Age: 65
End: 2020-01-14
Payer: COMMERCIAL

## 2020-01-14 VITALS
SYSTOLIC BLOOD PRESSURE: 118 MMHG | DIASTOLIC BLOOD PRESSURE: 84 MMHG | HEIGHT: 64 IN | HEART RATE: 95 BPM | BODY MASS INDEX: 25.61 KG/M2 | OXYGEN SATURATION: 97 % | WEIGHT: 150 LBS

## 2020-01-14 DIAGNOSIS — Z87.440 HISTORY OF RECURRENT UTIS: ICD-10-CM

## 2020-01-14 DIAGNOSIS — N28.1 ACQUIRED RENAL CYST OF LEFT KIDNEY: Primary | ICD-10-CM

## 2020-01-14 LAB
ALBUMIN UR-MCNC: NEGATIVE MG/DL
APPEARANCE UR: CLEAR
BILIRUB UR QL STRIP: NEGATIVE
COLOR UR AUTO: YELLOW
GLUCOSE UR STRIP-MCNC: NEGATIVE MG/DL
HGB UR QL STRIP: ABNORMAL
KETONES UR STRIP-MCNC: NEGATIVE MG/DL
LEUKOCYTE ESTERASE UR QL STRIP: NEGATIVE
NITRATE UR QL: NEGATIVE
PH UR STRIP: 5.5 PH (ref 5–7)
SOURCE: ABNORMAL
SP GR UR STRIP: 1.02 (ref 1–1.03)
UROBILINOGEN UR STRIP-ACNC: 0.2 EU/DL (ref 0.2–1)

## 2020-01-14 PROCEDURE — 99214 OFFICE O/P EST MOD 30 MIN: CPT | Performed by: UROLOGY

## 2020-01-14 PROCEDURE — 81003 URINALYSIS AUTO W/O SCOPE: CPT | Performed by: UROLOGY

## 2020-01-14 RX ORDER — SULFAMETHOXAZOLE AND TRIMETHOPRIM 400; 80 MG/1; MG/1
1 TABLET ORAL ONCE
Qty: 90 TABLET | Refills: 3 | Status: SHIPPED | OUTPATIENT
Start: 2020-01-14 | End: 2020-01-14

## 2020-01-14 ASSESSMENT — MIFFLIN-ST. JEOR: SCORE: 1225.4

## 2020-01-14 ASSESSMENT — PAIN SCALES - GENERAL: PAINLEVEL: NO PAIN (0)

## 2020-01-14 NOTE — PROGRESS NOTES
It is a pleasure to see this very pleasant 64-year-old lady in follow-up consultation today.  There were 2 key issues  1.  A large Bosniak 2 cyst on the left kidney which seem to be enlarging.  We decided that this should be aspirated and fluid sent for cytology and culture etc.  2.  History of recurrent urinary tract infection and we started her on low-dose Bactrim after the last infection in October which was E. coli and she is been taking 1 single strength Bactrim daily since then.    She has had no evidence of any infection since that time.  His general condition is satisfactory.  The vague flank pain she also had been having before aspiration of the cyst also seems to have gone.    I discussed the situation with her in detail today went over all the records reviewed all the pertinent radiologic studies and other studies including all lab studies including cytology of the aspirated cyst fluid etc.  My conclusions and recommendations are as follows  1.  We do not need to consider any further treatment for the cyst in the left kidney at the present time.  The symptoms she was having which were mild now seems to have gone but I have advised her that the cyst is probably going to refill again with time.  It seems unlikely that she has evidence of malignancy in the cyst but if the cyst fills and causes significant pain again it is possible we should consider laparoscopic decortication of the cyst although I think this is probably unlikely.  2.  The recurrent infections have been well controlled and she is had no further infections since starting low-dose Septra on a daily basis.  My recommendation is to continue this for the time being and I would recommend we complete a year of therapy with prophylactic low-dose Septra.  If she has breakthrough infections during that time I would like to hear from her promptly.  In 9 months time I would like to see her in the office to evaluate her progress and I would also like to  "repeat an ultrasound of the kidney at that time.    I carefully explained the entire situation to the patient in detail today.  I answered all her questions.    Plan.  9 months for ultrasound of kidney and examination.    Time.  We had a careful discussion today to go over 2 major issues 1 of which involve discussions about what therapeutic options would be considered if the cyst was causing further symptoms or problems including potential surgical options and the other discussion about what to do should she develop breakthrough infections and problems while on prophylactic antibiotic therapy and what other options we would consider after completing 1 year of prophylactic antibiotic therapy.  Total time of this was 25 minutes    \"This dictation was performed with voice recognition software and may contain errors,  omissions and inadvertent word substitution.\"    "

## 2020-01-14 NOTE — LETTER
1/14/2020       RE: Mari Turner  [place] [address]     Dear Colleague,    Thank you for referring your patient, Mari Turner, to the [facility] UROLOGY CLINIC [place] at [facility]. Please see a copy of my visit note below.    It is a pleasure to see this very pleasant 64-year-old lady in follow-up consultation today.  There were 2 key issues  1.  A large Bosniak 2 cyst on the left kidney which seem to be enlarging.  We decided that this should be aspirated and fluid sent for cytology and culture etc.  2.  History of recurrent urinary tract infection and we started her on low-dose Bactrim after the last infection in October which was E. coli and she is been taking 1 single strength Bactrim daily since then.    She has had no evidence of any infection since that time.  His general condition is satisfactory.  The vague flank pain she also had been having before aspiration of the cyst also seems to have gone.    I discussed the situation with her in detail today went over all the records reviewed all the pertinent radiologic studies and other studies including all lab studies including cytology of the aspirated cyst fluid etc.  My conclusions and recommendations are as follows  1.  We do not need to consider any further treatment for the cyst in the left kidney at the present time.  The symptoms she was having which were mild now seems to have gone but I have advised her that the cyst is probably going to refill again with time.  It seems unlikely that she has evidence of malignancy in the cyst but if the cyst fills and causes significant pain again it is possible we should consider laparoscopic decortication of the cyst although I think this is probably unlikely.  2.  The recurrent infections have been well controlled and she is had no further infections since starting low-dose Septra on a daily basis.  My recommendation is to continue  "this for the time being and I would recommend we complete a year of therapy with prophylactic low-dose Septra.  If she has breakthrough infections during that time I would like to hear from her promptly.  In 9 months time I would like to see her in the office to evaluate her progress and I would also like to repeat an ultrasound of the kidney at that time.    I carefully explained the entire situation to the patient in detail today.  I answered all her questions.    Plan.  9 months for ultrasound of kidney and examination.    Time.  We had a careful discussion today to go over 2 major issues 1 of which involve discussions about what therapeutic options would be considered if the cyst was causing further symptoms or problems including potential surgical options and the other discussion about what to do should she develop breakthrough infections and problems while on prophylactic antibiotic therapy and what other options we would consider after completing 1 year of prophylactic antibiotic therapy.  Total time of this was 25 minutes    \"This dictation was performed with voice recognition software and may contain errors,  omissions and inadvertent word substitution.\"      Again, thank you for allowing me to participate in the care of your patient.      Sincerely,    Parviz Mijares MD      "

## 2020-01-14 NOTE — NURSING NOTE
Chief Complaint   Patient presents with     Clinic Care Coordination - Follow-up     Pt here for follow-up     Priyanka Arriola CMA

## 2020-11-29 NOTE — PATIENT INSTRUCTIONS
"AFTER YOUR CYSTOSCOPY  ?  ?  You have just completed a cystoscopy, or \"cysto\", which allowed your physician to learn more about your bladder (or to remove a stent placed after surgery). We suggest that you continue to avoid caffeine, fruit juice, and alcohol for the next 24 hours, however, you are encouraged to return to your normal activities.  ?  ?  A few things that are considered normal after your cystoscopy:  ?  * small amount of bleeding (or spotting) that clears within the next 24 hours  ?  * slight burning sensation with urination  ?  * sensation of needing to void (urinate) more frequently  ?  * the feeling of \"air\" in your urine  ?  * mild discomfort that is relieved with Tylenol    * bladder spasms  ?  ?  ?  Please contact our office promptly if you:  ?  * develop a fever above 101 degrees  ?  * are unable to urinate  ?  * develop bright red blood that does not stop  ?  * experience severe pain or swelling  ?  ?  ?  And of course, please contact our office with any concerns or questions [phone]  ?    AFTER YOUR CYSTOSCOPY        You have just completed a cystoscopy, or \"cysto\", which allowed your physician to learn more about your bladder (or to remove a stent placed after surgery). We suggest that you continue to avoid caffeine, fruit juice, and alcohol for the next 24 hours, however, you are encouraged to return to your normal activities.         A few things that are considered normal after your cystoscopy:     * Small amount of bleeding (or spotting) that clears within the next 24 hours     * Slight burning sensation with urination     * Sensation to of needing to avoid more frequently     * The feeling of \"air\" in your urine     * Mild discomfort that is relieved with Tylenol        Please contact our office promptly if you:     * Develop a fever above 101 degrees     * Are unable to urinate     * Develop bright red blood that does not stop     * Severe pain or swelling         Please contact " our office with any concerns or questions @[facility].   sob

## 2021-05-15 ENCOUNTER — HEALTH MAINTENANCE LETTER (OUTPATIENT)
Age: 66
End: 2021-05-15

## 2021-09-04 ENCOUNTER — HEALTH MAINTENANCE LETTER (OUTPATIENT)
Age: 66
End: 2021-09-04

## 2022-06-11 ENCOUNTER — HEALTH MAINTENANCE LETTER (OUTPATIENT)
Age: 67
End: 2022-06-11

## 2022-07-01 ENCOUNTER — PRE VISIT (OUTPATIENT)
Dept: ONCOLOGY | Facility: CLINIC | Age: 67
End: 2022-07-01

## 2022-07-01 NOTE — TELEPHONE ENCOUNTER
Action 2022 JTV 3:50 PM    Action Taken [facility] sent a request to [facility] for images to be pushed.      Action 2022 JTV 3:13 PM    Action Taken [facility] received and resolved images from [facility].       MEDICAL RECORDS REQUEST   [facility]  [address] [place] [address]  PHONE: [phone]  Fax: [phone]        FUTURE VISIT INFORMATION                                                   Mrai I Johnny, : 1955 scheduled for future visit at [facility]    APPOINTMENT INFORMATION:    Date: 2022    Provider:  Ti Olvera MD    Reason for Visit/Diagnosis: kidney cyst    RECORDS REQUESTED FOR VISIT                                                     NOTES  STATUS/DETAILS   OFFICE NOTE from other specialist  yes, 2020, 2018 -- Parviz Mijares MD @ [facility]    OPERATIVE REPORT  yes, 10/05/2016 -- Radha Montoya MD - [facility]   MEDICATION LIST  yes   LABS     URINALYSIS (UA)  yes   KIDNEY STONE     CT ABD  yes, 2019 -- CT ABD   IMAGING (IMAGES & REPORT)  yes, 2019 -- US RENAL CYST  10/28/2019 - CT UROGRAM    10/05/2016 - CT ABD RENAL -- [facility]    2016 - US RENAL     PRE-VISIT CHECKLIST      Record collection complete yes   Appointment appropriately scheduled           (right time/right provider) Yes   Joint diagnostic appointment coordinated correctly          (ensure right order & amount of time) Yes   MyChart activation Yes   Questionnaire complete If no, please explain PENDING

## 2022-08-18 ENCOUNTER — OFFICE VISIT (OUTPATIENT)
Dept: ONCOLOGY | Facility: CLINIC | Age: 67
End: 2022-08-18
Attending: UROLOGY
Payer: COMMERCIAL

## 2022-08-18 VITALS
RESPIRATION RATE: 16 BRPM | WEIGHT: 170.5 LBS | DIASTOLIC BLOOD PRESSURE: 81 MMHG | TEMPERATURE: 98.1 F | SYSTOLIC BLOOD PRESSURE: 131 MMHG | BODY MASS INDEX: 30.21 KG/M2 | HEART RATE: 69 BPM | OXYGEN SATURATION: 99 % | HEIGHT: 63 IN

## 2022-08-18 DIAGNOSIS — N28.1 RENAL CYST: Primary | ICD-10-CM

## 2022-08-18 PROCEDURE — 99204 OFFICE O/P NEW MOD 45 MIN: CPT | Performed by: UROLOGY

## 2022-08-18 PROCEDURE — G0463 HOSPITAL OUTPT CLINIC VISIT: HCPCS

## 2022-08-18 RX ORDER — FLUTICASONE PROPIONATE 220 UG/1
AEROSOL, METERED RESPIRATORY (INHALATION)
COMMUNITY
Start: 2022-05-31

## 2022-08-18 RX ORDER — FLUTICASONE PROPIONATE 50 MCG
2 SPRAY, SUSPENSION (ML) NASAL
COMMUNITY
Start: 2022-03-08

## 2022-08-18 ASSESSMENT — PAIN SCALES - GENERAL: PAINLEVEL: NO PAIN (0)

## 2022-08-18 NOTE — PROGRESS NOTES
Urology Oncology Clinic   Renal mass             Chief Complaint:   Left renal cyst            Consult or Referral:     Mari Turner is a 67 year old female seen in consultation.         History of Present Illness:    Mari Turner is a very pleasant 67 year old female who is a former patient of  and last saw him in 2019 for a large left renal cyst presumably causing left flank pain and discomfort and also recurrent UTIs. After discussing options at that point, she underwent US guided percutaneous aspiration of the renal cyst which improved her symptoms. Cyst cytology was benign. Her UTIs almost simultaneously resolved which made her wonder whether the two issues were connected. Due to COVID and Dr. Mijares's detention, she did not follow up with urology since she did not have any significant issues. She started having the same vague flank discomfort recently and also had an episode of UTI almost a month ago which was treated and she has not had any recurrences since.      she does not have a history of previous abdominal or retroperitoneal surgery.  There is not a family history of renal cell cancer.      ECOG Performance Score: 0 - Independent           Past Medical History:     Past Medical History:   Diagnosis Date     Mumps             Past Surgical History:   No past surgical history on file.         Problem List:   There are no problems to display for this patient.           Medications     Current Outpatient Medications   Medication     Cyanocobalamin (VITAMIN B-12 PO)     FLOVENT  MCG/ACT inhaler     fluticasone (FLONASE) 50 MCG/ACT nasal spray     Multiple Vitamin (MULTI-VITAMIN DAILY PO)     Multiple Vitamins-Minerals (VITAMIN D3 COMPLETE PO)     simvastatin (ZOCOR) 20 MG tablet     No current facility-administered medications for this visit.            Family History:     Family History   Problem Relation Age of Onset     Cancer Mother             Social History:     Social History  "    Socioeconomic History     Marital status:      Spouse name: Not on file     Number of children: Not on file     Years of education: Not on file     Highest education level: Not on file   Occupational History     Not on file   Tobacco Use     Smoking status: Never Smoker     Smokeless tobacco: Never Used   Substance and Sexual Activity     Alcohol use: Yes     Drug use: Never     Sexual activity: Not Currently   Other Topics Concern     Parent/sibling w/ CABG, MI or angioplasty before 65F 55M? Not Asked   Social History Narrative     Not on file     Social Determinants of Health     Financial Resource Strain: Not on file   Food Insecurity: Not on file   Transportation Needs: Not on file   Physical Activity: Not on file   Stress: Not on file   Social Connections: Not on file   Intimate Partner Violence: Not on file   Housing Stability: Not on file            Allergies:   Patient has no known allergies.         Review of Systems:  From intake questionnaire     Negative 14 system review except as noted on HPI, nurse's note see below.         Physical Exam:     Patient is a 67 year old  female Body mass index is 30.69 kg/m .  Constitutional: Vitals: Blood pressure 131/81, pulse 69, temperature 98.1  F (36.7  C), temperature source Tympanic, resp. rate 16, height 1.588 m (5' 2.5\"), weight 77.3 kg (170 lb 8 oz), SpO2 99 %.  General Appearance Adult: Alert, no acute distress, oriented  HENT: throat/mouth:normal, good dentition  Neck: No adenopathy,masses or thyromegaly  Lungs/Repiratory: no respiratory distress, or pursed lip breathing  Heart: No obvious jugular venous distension present, normal heart rate  Abdomen: soft, nontender, no organomegaly or masses  Hematologic/Lymphatics: No cervical or supraclavicular adenopathy  Musculoskeltal: extremities normal, no peripheral edema  Skin: no noted suspicious lesions or rashes  Neuro: Alert, oriented, speech and mentation normal  Psych: affect and mood normal  Gait: " Normal without assistance  : deferred          Labs and Pathology:    No recent CBC or CMP to review     CULTURE  RESULT Abnormal     CULTURE  >100,000 CFU/mL Escherichia coli    Resulting Agency [facility]     Susceptibility    Organism Antibiotic Susceptibility   Escherichia coli TRIMETHOPRIM/SULF <=1/19: S   Escherichia coli AMPICILLIN >=32: R   Escherichia coli CEFAZOLIN 32: R   Escherichia coli CEFAZOLIN-UC 32: R    Comment: Cefazolin-UC interpretations are for therapy of uncomplicated UTIs due to E.coli, K.pneumoniae, or P.mirablis.   Escherichia coli GENTAMICIN <=1: S   Escherichia coli CEFTRIAXONE <=1: S   Escherichia coli CEFTAZIDIME <=1: S   Escherichia coli LEVOFLOXACIN <=0.12: S   Escherichia coli CIPROFLOXACIN <=0.25: S   Escherichia coli PIPERACILLIN/TAZO 64: I   Escherichia coli AMPICILLIN/SULBACTAM >=32: R   Escherichia coli CEFEPIME <=1: S   Escherichia coli TOBRAMYCIN <=1: S   Escherichia coli MEROPENEM <=0.25: S   Escherichia coli NITROFURANTOIN <=16: S   Specimen Collected: 06/19/22  9:01 AM Last Resulted: 06/21/22  6:35 AM               Imaging:    No recent imaging to review       Outside and Past Medical records:    I spent 20 minutes reviewing outside and past medical records          Assessment and Plan:     Assessment:  67 year old female with history of left renal cyst status post aspiration in 2020 now with recurrent flank fullness and discomfort       We discussed the possiblity of a recurrent renal cyst causing her symptoms and therefore, will obtain a kidney dedicated imaging to delineate her anatomy better. I told Mari that recurrent UTI and simple renal cysts generally don't have causal relationship.     Plan:  Obtain CT renal mass protocol   Call with the results       45 total minutes spent on the date of the encounter including direct interaction with the patient, performing chart review, documentation and further activities as noted  above      Ti Olvera MD   Department of Urology   [facility]

## 2022-08-18 NOTE — NURSING NOTE
"Oncology Rooming Note    August 18, 2022 8:10 AM   Mari Turner is a 67 year old female who presents for:    Chief Complaint   Patient presents with     Oncology Clinic Visit     New Patient     Initial Vitals: /81   Pulse 69   Temp 98.1  F (36.7  C) (Tympanic)   Resp 16   Ht 1.588 m (5' 2.5\")   Wt 77.3 kg (170 lb 8 oz)   SpO2 99%   BMI 30.69 kg/m   Estimated body mass index is 30.69 kg/m  as calculated from the following:    Height as of this encounter: 1.588 m (5' 2.5\").    Weight as of this encounter: 77.3 kg (170 lb 8 oz). Body surface area is 1.85 meters squared.  No Pain (0) Comment: Data Unavailable   No LMP recorded.  Allergies reviewed: Yes  Medications reviewed: Yes    Medications: Medication refills not needed today.  Pharmacy name entered into [facility]:    [id number] - [place], [place] - [address] AT [address]/[facility]ACY [address][id number] - [place], [place] - [address] [address]    Clinical concerns: New Patient      Kena Bowles CMA              "

## 2022-08-18 NOTE — LETTER
8/18/2022         RE: Mari Turner  [address]  [place] [address]        Dear Colleague,    Thank you for referring your patient, Mari Turner, to the [facility]. Please see a copy of my visit note below.    Urology Oncology Clinic   Renal mass             Chief Complaint:   Left renal cyst            Consult or Referral:     Mari Turner is a 67 year old female seen in consultation.         History of Present Illness:    Mari Turner is a very pleasant 67 year old female who is a former patient of  and last saw him in 2019 for a large left renal cyst presumably causing left flank pain and discomfort and also recurrent UTIs. After discussing options at that point, she underwent US guided percutaneous aspiration of the renal cyst which improved her symptoms. Cyst cytology was benign. Her UTIs almost simultaneously resolved which made her wonder whether the two issues were connected. Due to COVID and Dr. Mijares's alf, she did not follow up with urology since she did not have any significant issues. She started having the same vague flank discomfort recently and also had an episode of UTI almost a month ago which was treated and she has not had any recurrences since.      she does not have a history of previous abdominal or retroperitoneal surgery.  There is not a family history of renal cell cancer.      ECOG Performance Score: 0 - Independent           Past Medical History:     Past Medical History:   Diagnosis Date     Mumps             Past Surgical History:   No past surgical history on file.         Problem List:   There are no problems to display for this patient.           Medications     Current Outpatient Medications   Medication     Cyanocobalamin (VITAMIN B-12 PO)     FLOVENT  MCG/ACT inhaler     fluticasone (FLONASE) 50 MCG/ACT nasal spray     Multiple Vitamin (MULTI-VITAMIN DAILY PO)     Multiple Vitamins-Minerals (VITAMIN D3  "COMPLETE PO)     simvastatin (ZOCOR) 20 MG tablet     No current facility-administered medications for this visit.            Family History:     Family History   Problem Relation Age of Onset     Cancer Mother             Social History:     Social History     Socioeconomic History     Marital status:      Spouse name: Not on file     Number of children: Not on file     Years of education: Not on file     Highest education level: Not on file   Occupational History     Not on file   Tobacco Use     Smoking status: Never Smoker     Smokeless tobacco: Never Used   Substance and Sexual Activity     Alcohol use: Yes     Drug use: Never     Sexual activity: Not Currently   Other Topics Concern     Parent/sibling w/ CABG, MI or angioplasty before 65F 55M? Not Asked   Social History Narrative     Not on file     Social Determinants of Health     Financial Resource Strain: Not on file   Food Insecurity: Not on file   Transportation Needs: Not on file   Physical Activity: Not on file   Stress: Not on file   Social Connections: Not on file   Intimate Partner Violence: Not on file   Housing Stability: Not on file            Allergies:   Patient has no known allergies.         Review of Systems:  From intake questionnaire     Negative 14 system review except as noted on HPI, nurse's note see below.         Physical Exam:     Patient is a 67 year old  female Body mass index is 30.69 kg/m .  Constitutional: Vitals: Blood pressure 131/81, pulse 69, temperature 98.1  F (36.7  C), temperature source Tympanic, resp. rate 16, height 1.588 m (5' 2.5\"), weight 77.3 kg (170 lb 8 oz), SpO2 99 %.  General Appearance Adult: Alert, no acute distress, oriented  HENT: throat/mouth:normal, good dentition  Neck: No adenopathy,masses or thyromegaly  Lungs/Repiratory: no respiratory distress, or pursed lip breathing  Heart: No obvious jugular venous distension present, normal heart rate  Abdomen: soft, nontender, no organomegaly or " masses  Hematologic/Lymphatics: No cervical or supraclavicular adenopathy  Musculoskeltal: extremities normal, no peripheral edema  Skin: no noted suspicious lesions or rashes  Neuro: Alert, oriented, speech and mentation normal  Psych: affect and mood normal  Gait: Normal without assistance  : deferred          Labs and Pathology:    No recent CBC or CMP to review     CULTURE  RESULT Abnormal     CULTURE  >100,000 CFU/mL Escherichia coli    Resulting Agency [facility]     Susceptibility    Organism Antibiotic Susceptibility   Escherichia coli TRIMETHOPRIM/SULF <=1/19: S   Escherichia coli AMPICILLIN >=32: R   Escherichia coli CEFAZOLIN 32: R   Escherichia coli CEFAZOLIN-UC 32: R    Comment: Cefazolin-UC interpretations are for therapy of uncomplicated UTIs due to E.coli, K.pneumoniae, or P.mirablis.   Escherichia coli GENTAMICIN <=1: S   Escherichia coli CEFTRIAXONE <=1: S   Escherichia coli CEFTAZIDIME <=1: S   Escherichia coli LEVOFLOXACIN <=0.12: S   Escherichia coli CIPROFLOXACIN <=0.25: S   Escherichia coli PIPERACILLIN/TAZO 64: I   Escherichia coli AMPICILLIN/SULBACTAM >=32: R   Escherichia coli CEFEPIME <=1: S   Escherichia coli TOBRAMYCIN <=1: S   Escherichia coli MEROPENEM <=0.25: S   Escherichia coli NITROFURANTOIN <=16: S   Specimen Collected: 06/19/22  9:01 AM Last Resulted: 06/21/22  6:35 AM               Imaging:    No recent imaging to review       Outside and Past Medical records:    I spent 20 minutes reviewing outside and past medical records          Assessment and Plan:     Assessment:  67 year old female with history of left renal cyst status post aspiration in 2020 now with recurrent flank fullness and discomfort       We discussed the possiblity of a recurrent renal cyst causing her symptoms and therefore, will obtain a kidney dedicated imaging to delineate her anatomy better. I told Mari that recurrent UTI and simple renal cysts generally don't have causal  relationship.     Plan:  Obtain CT renal mass protocol   Call with the results       45 total minutes spent on the date of the encounter including direct interaction with the patient, performing chart review, documentation and further activities as noted above      Ti Olvera MD   Department of Urology   [facility]         Again, thank you for allowing me to participate in the care of your patient.        Sincerely,        Ti Olvera MD

## 2022-08-26 ENCOUNTER — HOSPITAL ENCOUNTER (OUTPATIENT)
Dept: CT IMAGING | Facility: CLINIC | Age: 67
Discharge: HOME | End: 2022-08-26
Attending: UROLOGY | Admitting: UROLOGY
Payer: COMMERCIAL

## 2022-08-26 DIAGNOSIS — N28.1 RENAL CYST: ICD-10-CM

## 2022-08-26 PROCEDURE — 250N000009 HC RX 250: Performed by: UROLOGY

## 2022-08-26 PROCEDURE — 250N000011 HC RX IP 250 OP 636: Performed by: UROLOGY

## 2022-08-26 PROCEDURE — 74178 CT ABD&PLV WO CNTR FLWD CNTR: CPT

## 2022-08-26 RX ORDER — IOPAMIDOL 755 MG/ML
500 INJECTION, SOLUTION INTRAVASCULAR ONCE
Status: COMPLETED | OUTPATIENT
Start: 2022-08-26 | End: 2022-08-26

## 2022-08-26 RX ADMIN — SODIUM CHLORIDE 58 ML: 9 INJECTION, SOLUTION INTRAVENOUS at 08:17

## 2022-08-26 RX ADMIN — IOPAMIDOL 77 ML: 755 INJECTION, SOLUTION INTRAVENOUS at 08:17

## 2022-09-12 DIAGNOSIS — N28.1 RENAL CYST, LEFT: Primary | ICD-10-CM

## 2022-10-22 ENCOUNTER — HEALTH MAINTENANCE LETTER (OUTPATIENT)
Age: 67
End: 2022-10-22

## 2023-06-18 ENCOUNTER — HEALTH MAINTENANCE LETTER (OUTPATIENT)
Age: 68
End: 2023-06-18

## 2023-11-05 ENCOUNTER — HEALTH MAINTENANCE LETTER (OUTPATIENT)
Age: 68
End: 2023-11-05

## 2023-11-30 ENCOUNTER — HOSPITAL ENCOUNTER (OUTPATIENT)
Dept: MAMMOGRAPHY | Facility: CLINIC | Age: 68
Discharge: HOME | End: 2023-11-30
Attending: STUDENT IN AN ORGANIZED HEALTH CARE EDUCATION/TRAINING PROGRAM | Admitting: STUDENT IN AN ORGANIZED HEALTH CARE EDUCATION/TRAINING PROGRAM
Payer: COMMERCIAL

## 2023-11-30 DIAGNOSIS — Z12.31 VISIT FOR SCREENING MAMMOGRAM: ICD-10-CM

## 2023-11-30 PROCEDURE — 77067 SCR MAMMO BI INCL CAD: CPT

## 2024-03-24 ENCOUNTER — HEALTH MAINTENANCE LETTER (OUTPATIENT)
Age: 69
End: 2024-03-24

## 2025-08-17 ENCOUNTER — HEALTH MAINTENANCE LETTER (OUTPATIENT)
Age: 70
End: 2025-08-17